# Patient Record
Sex: MALE
[De-identification: names, ages, dates, MRNs, and addresses within clinical notes are randomized per-mention and may not be internally consistent; named-entity substitution may affect disease eponyms.]

---

## 2017-03-09 ENCOUNTER — HOSPITAL (OUTPATIENT)
Dept: OTHER | Age: 52
End: 2017-03-09
Attending: INTERNAL MEDICINE

## 2017-03-09 LAB
A/G RATIO_: 1.2
ABS LYMPH: 2.2 K/CUMM (ref 1–3.5)
ABS MONO: 0.9 K/CUMM (ref 0.1–0.8)
ABS NEUTRO: 3.6 K/CUMM (ref 2–8)
ALBUMIN: 4.1 G/DL (ref 3.5–5)
ALK PHOS: 66 UNIT/L (ref 50–124)
ALT/GPT: 50 UNIT/L (ref 0–55)
ANION GAP SERPL CALC-SCNC: 13 MEQ/L (ref 10–20)
AST/GOT: 24 UNIT/L (ref 5–34)
BASOPHIL: 1 % (ref 0–1)
BILI TOTAL: 0.5 MG/DL (ref 0.2–1)
BUN SERPL-MCNC: 20 MG/DL (ref 6–20)
CALCIUM: 9.7 MG/DL (ref 8.4–10.2)
CHLORIDE: 109 MEQ/L (ref 97–107)
CREATININE: 0.81 MG/DL (ref 0.6–1.3)
DIFF_TYPE?: NORMAL
EOSINOPHIL: 3 % (ref 0–6)
GLOBULIN_: 3.3 G/DL (ref 2–4.1)
GLUCOSE LVL: 104 MG/DL (ref 70–99)
HCT VFR BLD CALC: 44 % (ref 36–51)
HEMOLYSIS 2+: NEGATIVE
HEMOLYSIS 2+: NEGATIVE
HEMOLYSIS 4+: NEGATIVE
HGB BLD-MCNC: 15.8 G/DL (ref 12–17)
ICTERIC 4+: NEGATIVE
IMMATURE GRAN: 0.7 % (ref 0–0.3)
INSTR WBC: 7 K/CUMM (ref 4–11)
LIPEMIC 3+: NEGATIVE
LYMPHOCYTE: 32 %
MAGNESIUM LEVEL: 2.1 MG/DL (ref 1.6–2.6)
MCH RBC QN AUTO: 32 PG (ref 25–35)
MCHC RBC AUTO-ENTMCNC: 36 G/DL (ref 32–37)
MCV RBC AUTO: 88 FL (ref 78–97)
MONOCYTE: 13 %
NEUTROPHIL: 51 %
NRBC BLD MANUAL-RTO: 0 % (ref 0–0.2)
PLATELET: 224 K/CUMM (ref 150–450)
POTASSIUM: 3.9 MEQ/L (ref 3.5–5.1)
RBC # BLD: 5.01 M/CUMM (ref 4.2–6)
RDW: 11.7 % (ref 11.5–14.5)
SODIUM: 141 MEQ/L (ref 136–145)
TCO2: 23 MEQ/L (ref 19–29)
TOTAL PROTEIN: 7.4 G/DL (ref 6.4–8.3)
TROPONIN I: <0.01 NG/ML
WBC # BLD: 7 K/CUMM (ref 4–11)

## 2017-03-10 LAB
ABS LYMPH: 2.5 K/CUMM (ref 1–3.5)
ABS MONO: 0.8 K/CUMM (ref 0.1–0.8)
ABS NEUTRO: 2.9 K/CUMM (ref 2–8)
ANION GAP SERPL CALC-SCNC: 16 MEQ/L (ref 10–20)
BASOPHIL: 0 % (ref 0–1)
BUN SERPL-MCNC: 18 MG/DL (ref 6–20)
CALCIUM: 8.8 MG/DL (ref 8.4–10.2)
CHLORIDE: 108 MEQ/L (ref 97–107)
CHOLESTEROL: 148 MG/DL
CREATININE: 0.77 MG/DL (ref 0.6–1.3)
DEVICE SN: NORMAL
DIFF_TYPE?: NORMAL
EOSINOPHIL: 4 % (ref 0–6)
GLUCOSE LVL: 112 MG/DL (ref 70–99)
HCT VFR BLD CALC: 43 % (ref 36–51)
HDLC SERPL-MCNC: 26 MG/DL (ref 40–60)
HEMOLYSIS 2+: NEGATIVE
HEMOLYSIS 4+: NEGATIVE
HGB BLD-MCNC: 15.2 G/DL (ref 12–17)
ICTERIC 4+: NEGATIVE
IMMATURE GRAN: 0.8 % (ref 0–0.3)
INSTR WBC: 6.5 K/CUMM (ref 4–11)
LDL DIRECT: 69 MG/DL (ref 0–100)
LDLC SERPL CALC-MCNC: ABNORMAL MG/DL
LYMPHOCYTE: 38 %
MCH RBC QN AUTO: 32 PG (ref 25–35)
MCHC RBC AUTO-ENTMCNC: 36 G/DL (ref 32–37)
MCV RBC AUTO: 89 FL (ref 78–97)
MONOCYTE: 12 %
NEUTROPHIL: 45 %
NRBC BLD MANUAL-RTO: 0 % (ref 0–0.2)
PLATELET: 205 K/CUMM (ref 150–450)
POC_GLU: 96 MG/DL (ref 70–99)
POTASSIUM: 4 MEQ/L (ref 3.5–5.1)
RBC # BLD: 4.82 M/CUMM (ref 4.2–6)
RDW: 11.6 % (ref 11.5–14.5)
SODIUM: 142 MEQ/L (ref 136–145)
TCO2: 22 MEQ/L (ref 19–29)
TECH_ID: NORMAL
TRIGL SERPL-MCNC: 403 MG/DL
TROPONIN I: 0.01 NG/ML
WBC # BLD: 6.5 K/CUMM (ref 4–11)

## 2017-05-31 ENCOUNTER — HOSPITAL (OUTPATIENT)
Dept: OTHER | Age: 52
End: 2017-05-31
Attending: EMERGENCY MEDICINE

## 2017-05-31 LAB
A/G RATIO_: 1.2
ABS LYMPH MAN: 2.6 K/CUMM (ref 1–3.5)
ABS MONO MAN: 0.8 K/CUMM (ref 0.1–0.8)
ABS NEUT MAN: 5.6 K/CUMM (ref 1.8–7.8)
ALBUMIN: 4 G/DL (ref 3.5–5)
ALK PHOS: 62 UNIT/L (ref 50–124)
ALT/GPT: 60 UNIT/L (ref 0–55)
ANION GAP SERPL CALC-SCNC: 16 MEQ/L (ref 10–20)
AST/GOT: 31 UNIT/L (ref 5–34)
BAND MAN: 1 % (ref 2–8)
BASOPHIL MAN: 0 % (ref 0–1)
BILI TOTAL: 0.5 MG/DL (ref 0.2–1)
BUN SERPL-MCNC: 13 MG/DL (ref 6–20)
CALCIUM: 9.7 MG/DL (ref 8.4–10.2)
CHLORIDE: 107 MEQ/L (ref 97–107)
CREATININE: 1.11 MG/DL (ref 0.6–1.3)
DIFF_TYPE?: ABNORMAL
EOS MAN: 4 % (ref 0–4)
GLOBULIN_: 3.4 G/DL (ref 2–4.1)
GLUCOSE LVL: 85 MG/DL (ref 70–99)
HCT VFR BLD CALC: 44 % (ref 36–51)
HEMOLYSIS 2+: NEGATIVE
HEMOLYSIS 4+: NEGATIVE
HGB BLD-MCNC: 15.6 G/DL (ref 12–17)
ICTERIC 4+: NEGATIVE
ICTERIC 4+: NEGATIVE
INSTR WBC: 9.4 K/CUMM (ref 4–11)
LIPASE LEVEL: 27 UNIT/L (ref 8–78)
LIPEMIC 3+: ABNORMAL
LYMPH MAN: 14 %
MCH RBC QN AUTO: 32 PG (ref 25–35)
MCHC RBC AUTO-ENTMCNC: 35 G/DL (ref 32–37)
MCV RBC AUTO: 90 FL (ref 78–97)
MONOCYTE MAN: 8 %
NRBC BLD MANUAL-RTO: 0 % (ref 0–0.2)
PLATELET: 224 K/CUMM (ref 150–450)
PLT ESTIMATE: ADEQUATE
POTASSIUM: 4 MEQ/L (ref 3.5–5.1)
RBC # BLD: 4.93 M/CUMM (ref 4.2–6)
RBC MORPH: NORMAL
RDW: 11.5 % (ref 11.5–14.5)
REACT LYMPH MAN: 14 %
SEGS MAN: 59 %
SODIUM: 143 MEQ/L (ref 136–145)
TCO2: 24 MEQ/L (ref 19–29)
TOTAL LYMPHS MANUAL: 28 %
TOTAL PROTEIN: 7.4 G/DL (ref 6.4–8.3)
TROPONIN I: <0.01 NG/ML
WBC # BLD: 9.4 K/CUMM (ref 4–11)

## 2017-08-03 ENCOUNTER — HOSPITAL (OUTPATIENT)
Dept: OTHER | Age: 52
End: 2017-08-03
Attending: INTERNAL MEDICINE

## 2017-08-03 LAB
A/G RATIO_: 1.1
ABS LYMPH: 1.3 K/CUMM (ref 1–3.5)
ABS MONO: 1.1 K/CUMM (ref 0.1–0.8)
ABS NEUTRO: 7 K/CUMM (ref 2–8)
ALBUMIN: 3.7 G/DL (ref 3.5–5)
ALK PHOS: 59 UNIT/L (ref 50–124)
ALT/GPT: 30 UNIT/L (ref 0–55)
ANION GAP SERPL CALC-SCNC: 14 MEQ/L (ref 10–20)
APTT PPP: 32 SECONDS (ref 22–30)
AST/GOT: 19 UNIT/L (ref 5–34)
BASOPHIL: 0 % (ref 0–1)
BILI TOTAL: 0.6 MG/DL (ref 0.2–1)
BNP: 20 PG/ML (ref 1–100)
BUN SERPL-MCNC: 11 MG/DL (ref 6–20)
CALCIUM: 9 MG/DL (ref 8.4–10.2)
CHLORIDE: 105 MEQ/L (ref 97–107)
CREATININE: 0.92 MG/DL (ref 0.6–1.3)
DIFF_TYPE?: NORMAL
EOSINOPHIL: 2 % (ref 0–6)
GLOBULIN_: 3.4 G/DL (ref 2–4.1)
GLUCOSE LVL: 106 MG/DL (ref 70–99)
HCT VFR BLD CALC: 42 % (ref 36–51)
HEMOLYSIS 2+: NEGATIVE
HEMOLYSIS 4+: NEGATIVE
HEMOLYSIS 4+: NEGATIVE
HGB BLD-MCNC: 14.8 G/DL (ref 12–17)
ICTERIC 4+: NEGATIVE
IMMATURE GRAN: 0.5 % (ref 0–0.3)
INR: 1.6 (ref 0.9–1.1)
INSTR WBC: 9.6 K/CUMM (ref 4–11)
LIPEMIC 3+: NEGATIVE
LITHIUM LEVEL: 0.7 MEQ/L (ref 0.5–1.5)
LYMPHOCYTE: 13 %
MCH RBC QN AUTO: 32 PG (ref 25–35)
MCHC RBC AUTO-ENTMCNC: 35 G/DL (ref 32–37)
MCV RBC AUTO: 92 FL (ref 78–97)
MONOCYTE: 12 %
NEUTROPHIL: 72 %
NRBC BLD MANUAL-RTO: 0 % (ref 0–0.2)
PLATELET: 176 K/CUMM (ref 150–450)
POTASSIUM: 3.9 MEQ/L (ref 3.5–5.1)
PROTHROMBIN TIME: 16.6 SECONDS (ref 9.7–11.6)
RBC # BLD: 4.57 M/CUMM (ref 4.2–6)
RDW: 11.5 % (ref 11.5–14.5)
SODIUM: 138 MEQ/L (ref 136–145)
TCO2: 23 MEQ/L (ref 19–29)
TOTAL PROTEIN: 7.1 G/DL (ref 6.4–8.3)
TROPONIN I: <0.01 NG/ML
WBC # BLD: 9.6 K/CUMM (ref 4–11)

## 2017-08-04 LAB
APTT PPP: 27 SECONDS (ref 22–30)
INR: 1.1 (ref 0.9–1.1)
PROTHROMBIN TIME: 11.4 SECONDS (ref 9.7–11.6)

## 2017-10-25 ENCOUNTER — HOSPITAL (OUTPATIENT)
Dept: OTHER | Age: 52
End: 2017-10-25
Attending: NURSE PRACTITIONER

## 2017-10-25 LAB
A/G RATIO_: 1
ABS LYMPH: 1 K/CUMM (ref 1–3.5)
ABS MONO: 0.2 K/CUMM (ref 0.1–0.8)
ABS NEUTRO: 7.1 K/CUMM (ref 2–8)
ALBUMIN: 4 G/DL (ref 3.5–5)
ALK PHOS: 70 UNIT/L (ref 50–124)
ALT/GPT: 26 UNIT/L (ref 0–55)
ANION GAP SERPL CALC-SCNC: 15 MEQ/L (ref 10–20)
AST/GOT: 16 UNIT/L (ref 5–34)
BASOPHIL: 0 % (ref 0–1)
BILI TOTAL: 0.5 MG/DL (ref 0.2–1)
BUN SERPL-MCNC: 17 MG/DL (ref 6–20)
CALCIUM: 9.9 MG/DL (ref 8.4–10.2)
CHLORIDE: 108 MEQ/L (ref 97–107)
CREATININE: 0.91 MG/DL (ref 0.6–1.3)
CRP INFLAMMATION: 2 MG/L (ref 0.1–8.2)
DIFF_TYPE?: NORMAL
EOSINOPHIL: 0 % (ref 0–6)
GLOBULIN_: 3.9 G/DL (ref 2–4.1)
GLUCOSE LVL: 176 MG/DL (ref 70–99)
HCT VFR BLD CALC: 46 % (ref 36–51)
HEMOLYSIS 2+: NEGATIVE
HGB BLD-MCNC: 16.2 G/DL (ref 12–17)
IMMATURE GRAN: 0.6 % (ref 0–0.3)
INSTR WBC: 8.3 K/CUMM (ref 4–11)
LIPEMIC 3+: NEGATIVE
LITHIUM LEVEL: 0.2 MEQ/L (ref 0.5–1.5)
LYMPHOCYTE: 12 %
MCH RBC QN AUTO: 32 PG (ref 25–35)
MCHC RBC AUTO-ENTMCNC: 35 G/DL (ref 32–37)
MCV RBC AUTO: 91 FL (ref 78–97)
MONOCYTE: 2 %
NEUTROPHIL: 85 %
NRBC BLD MANUAL-RTO: 0 % (ref 0–0.2)
PLATELET: 237 K/CUMM (ref 150–450)
POTASSIUM: 4 MEQ/L (ref 3.5–5.1)
RBC # BLD: 5.07 M/CUMM (ref 4.2–6)
RDW: 11.5 % (ref 11.5–14.5)
SODIUM: 142 MEQ/L (ref 136–145)
TCO2: 23 MEQ/L (ref 19–29)
TOTAL CK: 59 U/L (ref 30–200)
TOTAL PROTEIN: 7.9 G/DL (ref 6.4–8.3)
WBC # BLD: 8.3 K/CUMM (ref 4–11)

## 2017-12-20 ENCOUNTER — HOSPITAL (OUTPATIENT)
Dept: OTHER | Age: 52
End: 2017-12-20
Attending: FAMILY MEDICINE

## 2018-01-08 ENCOUNTER — HOSPITAL (OUTPATIENT)
Dept: OTHER | Age: 53
End: 2018-01-08
Attending: FAMILY MEDICINE

## 2018-03-13 ENCOUNTER — HOSPITAL (OUTPATIENT)
Dept: OTHER | Age: 53
End: 2018-03-13
Attending: NURSE PRACTITIONER

## 2018-03-13 LAB
A/G RATIO_: 1.2
ALBUMIN: 4.1 G/DL (ref 3.5–5)
ALK PHOS: 67 UNIT/L (ref 50–124)
ALT/GPT: 26 UNIT/L (ref 0–55)
ANION GAP SERPL CALC-SCNC: 10 MEQ/L (ref 10–20)
AST/GOT: 14 UNIT/L (ref 5–34)
BILI TOTAL: 0.8 MG/DL (ref 0.2–1)
BUN SERPL-MCNC: 19 MG/DL (ref 6–20)
CALCIUM: 9.5 MG/DL (ref 8.4–10.2)
CHLORIDE: 108 MEQ/L (ref 97–107)
CHOLESTEROL: 145 MG/DL
CREATININE: 0.86 MG/DL (ref 0.6–1.3)
GLOBULIN_: 3.5 G/DL (ref 2–4.1)
GLUCOSE LVL: 103 MG/DL (ref 70–99)
HDLC SERPL-MCNC: 36 MG/DL (ref 40–60)
HEMOLYSIS 2+: NEGATIVE
HEMOLYSIS 4+: NEGATIVE
LDLC SERPL CALC-MCNC: 68 MG/DL
LIPEMIC 3+: NEGATIVE
POTASSIUM: 4.1 MEQ/L (ref 3.5–5.1)
PSA LEVEL: 0.29 NG/ML (ref 0–4)
SODIUM: 139 MEQ/L (ref 136–145)
TCO2: 25 MEQ/L (ref 19–29)
TOTAL PROTEIN: 7.6 G/DL (ref 6.4–8.3)
TRIGL SERPL-MCNC: 203 MG/DL
TSH SERPL-ACNC: 2.4 MIU/ML (ref 0.4–5)

## 2018-05-08 ENCOUNTER — HOSPITAL (OUTPATIENT)
Dept: OTHER | Age: 53
End: 2018-05-08
Attending: INTERNAL MEDICINE

## 2018-05-08 LAB
A/G RATIO_: 1.1
ABS LYMPH: 2.2 K/CUMM (ref 1–3.5)
ABS MONO: 1.3 K/CUMM (ref 0.1–0.8)
ABS NEUTRO: 4 K/CUMM (ref 2–8)
ALBUMIN: 4 G/DL (ref 3.5–5)
ALK PHOS: 58 UNIT/L (ref 50–124)
ALT/GPT: 35 UNIT/L (ref 0–55)
ANION GAP SERPL CALC-SCNC: 13 MEQ/L (ref 10–20)
APTT PPP: 27 SECONDS (ref 22–30)
AST/GOT: 20 UNIT/L (ref 5–34)
BASOPHIL: 0 % (ref 0–1)
BILI TOTAL: 0.6 MG/DL (ref 0.2–1)
BNP: <10 PG/ML (ref 1–100)
BUN SERPL-MCNC: 13 MG/DL (ref 6–20)
CALCIUM: 9.8 MG/DL (ref 8.4–10.2)
CHLORIDE: 107 MEQ/L (ref 97–107)
CREATININE: 0.97 MG/DL (ref 0.6–1.3)
DIFF_TYPE?: NORMAL
EOSINOPHIL: 4 % (ref 0–6)
GLOBULIN_: 3.8 G/DL (ref 2–4.1)
GLUCOSE LVL: 99 MG/DL (ref 70–99)
HCT VFR BLD CALC: 45 % (ref 36–51)
HEMOLYSIS 2+: NEGATIVE
HEMOLYSIS 4+: NEGATIVE
HEMOLYSIS 4+: NEGATIVE
HGB BLD-MCNC: 15.6 G/DL (ref 12–17)
ICTERIC 4+: NEGATIVE
IMMATURE GRAN: 0.4 % (ref 0–0.3)
INR: 1.1 (ref 0.9–1.1)
INSTR WBC: 7.9 K/CUMM (ref 4–11)
LIPEMIC 3+: NEGATIVE
LYMPHOCYTE: 29 %
MCH RBC QN AUTO: 32 PG (ref 25–35)
MCHC RBC AUTO-ENTMCNC: 34 G/DL (ref 32–37)
MCV RBC AUTO: 92 FL (ref 78–97)
MONOCYTE: 16 %
NEUTROPHIL: 51 %
NRBC BLD MANUAL-RTO: 0 % (ref 0–0.2)
PLATELET: 179 K/CUMM (ref 150–450)
POTASSIUM: 3.8 MEQ/L (ref 3.5–5.1)
PROTHROMBIN TIME: 11 SECONDS (ref 9.7–11.6)
RBC # BLD: 4.93 M/CUMM (ref 4.2–6)
RDW: 11.5 % (ref 11.5–14.5)
SODIUM: 140 MEQ/L (ref 136–145)
TCO2: 24 MEQ/L (ref 19–29)
TOTAL PROTEIN: 7.8 G/DL (ref 6.4–8.3)
TROPONIN I: <0.01 NG/ML
WBC # BLD: 7.9 K/CUMM (ref 4–11)

## 2018-05-09 LAB
ABS LYMPH: 0.6 K/CUMM (ref 1–3.5)
ABS MONO: 0.3 K/CUMM (ref 0.1–0.8)
ABS NEUTRO: 7.7 K/CUMM (ref 2–8)
ANION GAP SERPL CALC-SCNC: 14 MEQ/L (ref 10–20)
BASOPHIL: 0 % (ref 0–1)
BUN SERPL-MCNC: 14 MG/DL (ref 6–20)
CALCIUM: 9.3 MG/DL (ref 8.4–10.2)
CHLORIDE: 108 MEQ/L (ref 97–107)
CREATININE: 0.81 MG/DL (ref 0.6–1.3)
DIFF_TYPE?: ABNORMAL
EOSINOPHIL: 0 % (ref 0–6)
GLUCOSE LVL: 213 MG/DL (ref 70–99)
HCT VFR BLD CALC: 41 % (ref 36–51)
HEMOLYSIS 2+: NEGATIVE
HGB BLD-MCNC: 14.1 G/DL (ref 12–17)
IMMATURE GRAN: 0.6 % (ref 0–0.3)
INSTR WBC: 8.7 K/CUMM (ref 4–11)
LYMPHOCYTE: 7 %
MCH RBC QN AUTO: 32 PG (ref 25–35)
MCHC RBC AUTO-ENTMCNC: 34 G/DL (ref 32–37)
MCV RBC AUTO: 93 FL (ref 78–97)
MONOCYTE: 4 %
NEUTROPHIL: 88 %
NRBC BLD MANUAL-RTO: 0 % (ref 0–0.2)
PLATELET: 182 K/CUMM (ref 150–450)
POTASSIUM: 4 MEQ/L (ref 3.5–5.1)
RBC # BLD: 4.4 M/CUMM (ref 4.2–6)
RDW: 11.1 % (ref 11.5–14.5)
SODIUM: 137 MEQ/L (ref 136–145)
TCO2: 19 MEQ/L (ref 19–29)
WBC # BLD: 8.7 K/CUMM (ref 4–11)

## 2018-05-10 LAB
ABS LYMPH: 1 K/CUMM (ref 1–3.5)
ABS MONO: 0.6 K/CUMM (ref 0.1–0.8)
ABS NEUTRO: 14.9 K/CUMM (ref 2–8)
ANION GAP SERPL CALC-SCNC: 14 MEQ/L (ref 10–20)
BASOPHIL: 0 % (ref 0–1)
BUN SERPL-MCNC: 16 MG/DL (ref 6–20)
CALCIUM: 9.3 MG/DL (ref 8.4–10.2)
CHLORIDE: 108 MEQ/L (ref 97–107)
CREATININE: 0.81 MG/DL (ref 0.6–1.3)
DIFF_TYPE?: ABNORMAL
EOSINOPHIL: 0 % (ref 0–6)
GLUCOSE LVL: 161 MG/DL (ref 70–99)
HCT VFR BLD CALC: 44 % (ref 36–51)
HEMOLYSIS 2+: NEGATIVE
HGB BLD-MCNC: 14.9 G/DL (ref 12–17)
IMMATURE GRAN: 0.8 % (ref 0–0.3)
INSTR WBC: 16.7 K/CUMM (ref 4–11)
LYMPHOCYTE: 6 %
MCH RBC QN AUTO: 32 PG (ref 25–35)
MCHC RBC AUTO-ENTMCNC: 34 G/DL (ref 32–37)
MCV RBC AUTO: 93 FL (ref 78–97)
MONOCYTE: 4 %
NEUTROPHIL: 89 %
NRBC BLD MANUAL-RTO: 0 % (ref 0–0.2)
PLATELET: 198 K/CUMM (ref 150–450)
PLT ESTIMATE: ADEQUATE
POTASSIUM: 4.2 MEQ/L (ref 3.5–5.1)
RBC # BLD: 4.7 M/CUMM (ref 4.2–6)
RBC MORPH: NORMAL
RDW: 11.4 % (ref 11.5–14.5)
SODIUM: 141 MEQ/L (ref 136–145)
TCO2: 23 MEQ/L (ref 19–29)
VANCO TR: 6.7 UG/ML (ref 5–15)
WBC # BLD: 16.7 K/CUMM (ref 4–11)

## 2018-05-12 LAB — VANCO TR: 8.4 UG/ML (ref 5–15)

## 2018-05-13 LAB — VANCO TR: 12.7 UG/ML (ref 5–15)

## 2018-05-31 ENCOUNTER — HOSPITAL (OUTPATIENT)
Dept: OTHER | Age: 53
End: 2018-05-31
Attending: INTERNAL MEDICINE

## 2018-05-31 LAB
ABS LYMPH: 1.9 K/CUMM (ref 1–3.5)
ABS MONO: 0.8 K/CUMM (ref 0.1–0.8)
ABS NEUTRO: 4.4 K/CUMM (ref 2–8)
BASOPHIL: 1 % (ref 0–1)
BUN POC: 13 MG/DL (ref 7–22)
CREATININE POC: 0.8 MG/DL (ref 0.6–1.2)
DIFF_TYPE?: ABNORMAL
EGFR POC: 60 ML/MIN
EOSINOPHIL: 2 % (ref 0–6)
HCT VFR BLD CALC: 42 % (ref 36–51)
HGB BLD-MCNC: 14.4 G/DL (ref 12–17)
IGE TOTAL: 85.5 IUNITS/ML
IMMATURE GRAN: 1.2 % (ref 0–0.3)
INSTR WBC: 7.4 K/CUMM (ref 4–11)
LYMPHOCYTE: 26 %
MCH RBC QN AUTO: 31 PG (ref 25–35)
MCHC RBC AUTO-ENTMCNC: 34 G/DL (ref 32–37)
MCV RBC AUTO: 92 FL (ref 78–97)
MONOCYTE: 10 %
NEUTROPHIL: 60 %
NRBC BLD MANUAL-RTO: 0 % (ref 0–0.2)
PLATELET: 146 K/CUMM (ref 150–450)
RBC # BLD: 4.59 M/CUMM (ref 4.2–6)
RDW: 11.9 % (ref 11.5–14.5)
WBC # BLD: 7.4 K/CUMM (ref 4–11)

## 2018-06-01 ENCOUNTER — HOSPITAL (OUTPATIENT)
Dept: OTHER | Age: 53
End: 2018-06-01
Attending: INTERNAL MEDICINE

## 2018-06-02 ENCOUNTER — HOSPITAL (OUTPATIENT)
Dept: OTHER | Age: 53
End: 2018-06-02
Attending: INTERNAL MEDICINE

## 2020-01-15 ENCOUNTER — TRANSCRIBE ORDERS (OUTPATIENT)
Dept: OCCUPATIONAL THERAPY | Facility: CLINIC | Age: 55
End: 2020-01-15

## 2020-01-15 ENCOUNTER — TREATMENT (OUTPATIENT)
Dept: PHYSICAL THERAPY | Facility: CLINIC | Age: 55
End: 2020-01-15

## 2020-01-15 DIAGNOSIS — S39.012S STRAIN OF LUMBAR REGION, SEQUELA: ICD-10-CM

## 2020-01-15 DIAGNOSIS — S29.019S THORACIC MYOFASCIAL STRAIN, SEQUELA: Primary | ICD-10-CM

## 2020-01-15 DIAGNOSIS — S29.019D THORACIC MYOFASCIAL STRAIN, SUBSEQUENT ENCOUNTER: Primary | ICD-10-CM

## 2020-01-15 DIAGNOSIS — S39.012D STRAIN OF LUMBAR REGION, SUBSEQUENT ENCOUNTER: ICD-10-CM

## 2020-01-15 PROCEDURE — 97161 PT EVAL LOW COMPLEX 20 MIN: CPT | Performed by: PHYSICAL THERAPIST

## 2020-01-15 PROCEDURE — 97014 ELECTRIC STIMULATION THERAPY: CPT | Performed by: PHYSICAL THERAPIST

## 2020-01-15 PROCEDURE — 97035 APP MDLTY 1+ULTRASOUND EA 15: CPT | Performed by: PHYSICAL THERAPIST

## 2020-01-15 NOTE — PROGRESS NOTES
Physical Therapy Initial Evaluation and Plan of Care    Patient: Reddy Stewart   : 1965  Diagnosis/ICD-10 Code:  Thoracic myofascial strain, subsequent encounter [S29.019D]  Referring practitioner: Benjamin Crum MD    Subjective Evaluation    History of Present Illness  Date of onset: 1/3/2020  Mechanism of injury: Lifting a dresser at [employer]ok - some strain, it then shifted and he caught it causing increased pain   Same day BW - toradol, aleve and flexeril  BW today - pred pack, flexeril, ibu  Works on cars, hunts, fishes  Had been doing better until he worked yesterday - had to work outside of [employer] causing increased pain        Patient Occupation: [employer] 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[employer], Receiving,m shipping of donated goods Pain  Current pain ratin  At best pain ratin  At worst pain ratin  Location: Left mid to lower thoracic musculature, into left buttocks, occasional tingling into the lateral calf to 3-5th toes  Quality: sharp, knife-like, radiating and cramping  Relieving factors: change in position, relaxation, heat and medications (stretching)  Aggravating factors: repetitive movement, outstretched reach, overhead activity, lifting and squatting (sitting, deep breath, transition sit to stand)  Progression: worsening    Social Support  Lives in: multiple-level home    Hand dominance: left    Diagnostic Tests  No diagnostic tests performed    Treatments  Previous treatment: medication  Current treatment: medication and physical therapy  Patient Goals  Patient goal: GEt better and RTW     Objective     Special Questions  Patient is experiencing disturbed sleep.     Additional Special Questions  Positive Valsalva      Postural Observations  Seated posture: poor  Standing posture: fair    Additional Postural Observation Details  Very guarded movement patterns - guarded gait as well after transitioning from sitting but was noted earlier walking with more fluid gait pattern  Poor seated  posture with protracted shoulders    Neurological Testing     Sensation     Lumbar   Left   Intact: light touch    Right   Intact: light touch    Active Range of Motion     Lumbar   Flexion: 50 (pain with return to neutral) degrees with pain  Extension: 50 degrees   Left lateral flexion: 75 degrees   Right lateral flexion: 75 degrees   Left rotation: 75 degrees   Right rotation: 75 degrees     Additional Active Range of Motion Details  Measurements listed as taken in percentages      Strength/Myotome Testing     Left Hip   Planes of Motion   Flexion: 4-    Right Hip   Planes of Motion   Flexion: 4-    Left Knee   Flexion: 4-  Extension: 4    Right Knee   Flexion: 4-  Extension: 4    Left Ankle/Foot   Dorsiflexion: 5  Great toe extension: 5    Right Ankle/Foot   Dorsiflexion: 5  Great toe extension: 5    Additional Strength Details  Pain inhibition with testing of hip and knee mm    Tests       Thoracic   Negative slump.     Lumbar     Left   Positive valsalva.   Negative crossed SLR and passive SLR.     Right   Negative passive SLR.     Left Pelvic Girdle/Sacrum   Negative: sacrum compression and gapping.          Assessment & Plan     Assessment  Impairments: abnormal gait, abnormal muscle firing, abnormal muscle tone, abnormal or restricted ROM, activity intolerance, impaired physical strength, lacks appropriate home exercise program and pain with function  Assessment details: 54 y.o. Male with thoracolumbar strain with exacerbation yesterday presents with: 1. Constant pain, 2. Decreased spinal AROM, 3. Increased muscle tone in Left>right thoracolumbar PVM, 4. Guarded transitional movements, 5. 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  Patient will have functional pain free spinal AROM  Patient will be able to lift 50# from floor to waist without increased symptoms  Patient will return to work with min/no restrictions      Plan  Therapy options: will be seen for skilled physical therapy services  Planned modality interventions: TENS, thermotherapy (hydrocollator packs) and ultrasound  Planned therapy interventions: manual therapy, strengthening, stretching, postural training and home exercise program  Frequency: 3x week  Duration in visits: 12  Duration in weeks: 4  Treatment plan discussed with: patient  Plan details: Patient issued written HEP of exercises performed in clinic today      Manual Therapy:    0     mins  00145;  Therapeutic Exercise:    5     mins  37440;     Neuromuscular Roxanna:    0    mins  55361;    Therapeutic Activity:     5     mins  85741;   Rehab process    Evaluation Time:     25  mins  Timed Treatment:   18   mins   Total Treatment:     75   mins    PT SIGNATURE: Lizet Foster, PT   DATE TREATMENT INITIATED: 1/15/2020    Initial Certification  Certification Period: 4/14/2020  I certify that the therapy services are furnished while this patient is under my care.  The services outlined above are required by this patient, and will be reviewed every 90 days.     PHYSICIAN: Benjamin Crum MD ___________________________      DATE: _______________________    Please sign and return via fax to [phone].. Thank you, [facility].

## 2020-01-20 ENCOUNTER — TREATMENT (OUTPATIENT)
Dept: PHYSICAL THERAPY | Facility: CLINIC | Age: 55
End: 2020-01-20

## 2020-01-20 DIAGNOSIS — S39.012D STRAIN OF LUMBAR REGION, SUBSEQUENT ENCOUNTER: ICD-10-CM

## 2020-01-20 DIAGNOSIS — S29.019D THORACIC MYOFASCIAL STRAIN, SUBSEQUENT ENCOUNTER: Primary | ICD-10-CM

## 2020-01-20 PROCEDURE — 97014 ELECTRIC STIMULATION THERAPY: CPT | Performed by: PHYSICAL THERAPIST

## 2020-01-20 PROCEDURE — 97110 THERAPEUTIC EXERCISES: CPT | Performed by: PHYSICAL THERAPIST

## 2020-01-20 PROCEDURE — 97035 APP MDLTY 1+ULTRASOUND EA 15: CPT | Performed by: PHYSICAL THERAPIST

## 2020-01-20 NOTE — PROGRESS NOTES
Physical Therapy Daily Progress Note    VISIT#: 2    Subjective   Reddy Stewart reports: that he has some increased soreness after normal ADL's yesterday.  Pain is mostly on the left side with minimal sorness in the right.  He denies any LE pain.  Notes mostly tightness with some slight pain.  States that he is taking his IBU tiw and flexeril biw.      Objective   Presents moving in fluid movement patterns    Spinal flexion 75% with complaints of pain greater with return to neutral than initial flexion    See Exercise, Manual, and Modality Logs for complete treatment.     Patient Education:    Assessment/Plan  Increased exercise tolerance with more fluid movements.  Less noted abnormal muscle tone in thoracic PVM.    Progress strengthening /stabilization /functional activity           Manual Therapy:    0     mins  96301;  Therapeutic Exercise:    30     mins  28234;     Neuromuscular Roxanna:    0    mins  25068;    Therapeutic Activity:     0     mins  84933;       Timed Treatment:   38   mins   Total Treatment:     70   mins    Lizet Foster, PT  [place] License # [id number]  Physical Therapist

## 2020-01-22 ENCOUNTER — TREATMENT (OUTPATIENT)
Dept: PHYSICAL THERAPY | Facility: CLINIC | Age: 55
End: 2020-01-22

## 2020-01-22 DIAGNOSIS — S29.019D THORACIC MYOFASCIAL STRAIN, SUBSEQUENT ENCOUNTER: Primary | ICD-10-CM

## 2020-01-22 DIAGNOSIS — S39.012D STRAIN OF LUMBAR REGION, SUBSEQUENT ENCOUNTER: ICD-10-CM

## 2020-01-22 PROCEDURE — 97110 THERAPEUTIC EXERCISES: CPT | Performed by: PHYSICAL THERAPIST

## 2020-01-22 PROCEDURE — 97014 ELECTRIC STIMULATION THERAPY: CPT | Performed by: PHYSICAL THERAPIST

## 2020-01-22 PROCEDURE — 97530 THERAPEUTIC ACTIVITIES: CPT | Performed by: PHYSICAL THERAPIST

## 2020-01-22 NOTE — PROGRESS NOTES
MD Letter - Reassessment    Date of Initial Visit: Type: THERAPY  Noted: 1/15/2020  Today's Date: 1/22/2020  Patient seen for 3 sessions    Treatment has included: therapeutic exercise, electrical stimulation, ultrasound and moist heat He has had to cancel 1 appt due to illness.     Subjective   Reports that his back is feeling better than last week but still feels quite stiff.  He coniniues to report that left thoracolumbar tightness but the pain has decreased.  He denies any radiation of symptoms.  He states that his pain now varies from 0-5/10 compared to 4-7/10 upon his initial evaluation.      Objective - he presents moving in fluid movement patterns  Spinal AROM - flexion 75%, extension 75%, Lateral flexion and rotation 75% bilaterally.  He reports pain with return to neutral from all excursions of movement  Strength - no focal weakness noted in LE's  Sensation - intact  Palpation - increased muscle tone in the left>right thoracolumbar PVM, reported increased pain with PA glides of T9-L3 spinous processes  Special tests - negative for any abnormal neural involvement   Activity tolerances - he is able to walk for moderately prolonged periods of time without pain.  He has soreness with prologned sitting.  He is able to slift 20# to waist level without pain but had pain with attempts of lifting to shoulder level.    Assessment/Plan  Patient has demonstrated moderate improvement since the initiation of therapy.  The pain has  Decreased in both frequency and intensity.  The motion has increased.  The activity tolerances have increased but not to work demand level.  I feel that the patient would benefit from continued therapy.  If you have any questions concerning the care, please do not hesitate to contact me.          PT Signature: Lizet Foster, PT        Manual Therapy:    0     mins  10779;  Therapeutic Exercise:    25/30     mins  71014;     Neuromuscular Roxanna:    0    mins  01927;    Therapeutic Activity:      10     mins  61390; Assessed for MD and work status      Timed Treatment:   35   mins   Total Treatment:     60   mins

## 2020-01-27 NOTE — PROGRESS NOTES
MD Note    Patient: Reddy Stewart   Diagnosis/ICD-10 Code:  Thoracic myofascial strain, subsequent encounter [S29.019D]  Referring practitioner: Benjamin Crum MD  Date of Initial Visit: Type: THERAPY  Noted: 1/15/2020  Today's Date: 1/29/2020  Patient seen for 4/5 sessions    Treatment has included: therapeutic exercise, electrical stimulation, ultrasound and moist heat  Subjective   My back is feeling a little  better with pain rated 6/10 midline L-S - L>R.  I've done all my exercises at home.  I do get some soreness after. I have difficulty putting on my socks in the morning.       Objective - he presents moving in fluid movement patterns  Spinal AROM - flexion WFL with LLBP, extension 75% with LLBP, Lateral flexion and rotation 75% bilaterally.  He reports pain with return to neutral from all excursions of movement  Strength - B LE myotomes 5/5  Sensation - intact  Palpation - increased muscle tone in the left>right thoracolumbar PVM, reported increased pain with PA glides of T8-L1 spinous processes  Special tests - LBP only with slump testing and ASLR at 45 degrees.   Activity tolerances - he is able to walk for moderately prolonged periods of time without pain.  Slow, guarded transitional movements with vocalizations. Antalgic gait noted within clinic. He has soreness with prologned sitting.  He is able to lift 20# to waist level with mild pain but had significant pain with lifting to shoulder level and was not able to safely return box to floor.     Assessment/Plan  This has been his only visit since his last MD assessment without significant progress outside of improved trunk flexion. Activity tolerances have not improved and pt has difficulty tolerating low level stretches in PT.  Please advise after your exam.     Timed:  Manual Therapy:    -     mins  92356;  Therapeutic Exercise:    25     mins  73641;     Neuromuscular Roxanna:    -    mins  83220;     Therapeutic Activity:     10     mins  78383; (MD assessment)    Gait Training:      -     mins  13750;     Ultrasound:     -     mins  30719;    Iontophoresis                 -     mins 16908  Dry Needling                  -     Min self pay    Untimed:  Electrical Stimulation:    20     mins  83077 ( );  Mech traction                   -     mins 05732    Timed Treatment:   35   mins   Total Treatment:     65   mins    PT Signature: Bety Meraz, CHELSY  [place] License # [id number]

## 2020-01-29 ENCOUNTER — TREATMENT (OUTPATIENT)
Dept: PHYSICAL THERAPY | Facility: CLINIC | Age: 55
End: 2020-01-29

## 2020-01-29 DIAGNOSIS — S29.019D THORACIC MYOFASCIAL STRAIN, SUBSEQUENT ENCOUNTER: Primary | ICD-10-CM

## 2020-01-29 DIAGNOSIS — S39.012D STRAIN OF LUMBAR REGION, SUBSEQUENT ENCOUNTER: ICD-10-CM

## 2020-01-29 PROCEDURE — 97530 THERAPEUTIC ACTIVITIES: CPT | Performed by: PHYSICAL THERAPIST

## 2020-01-29 PROCEDURE — 97110 THERAPEUTIC EXERCISES: CPT | Performed by: PHYSICAL THERAPIST

## 2020-01-29 PROCEDURE — 97014 ELECTRIC STIMULATION THERAPY: CPT | Performed by: PHYSICAL THERAPIST

## 2020-07-06 ENCOUNTER — TRANSCRIBE ORDERS (OUTPATIENT)
Dept: ADMINISTRATIVE | Facility: HOSPITAL | Age: 55
End: 2020-07-06

## 2020-07-06 DIAGNOSIS — R55 BLACKOUT SPELL: Primary | ICD-10-CM

## 2020-07-06 DIAGNOSIS — R05.9 COUGHING: ICD-10-CM

## 2020-07-08 ENCOUNTER — TRANSCRIBE ORDERS (OUTPATIENT)
Dept: ADMINISTRATIVE | Facility: HOSPITAL | Age: 55
End: 2020-07-08

## 2020-07-08 DIAGNOSIS — E78.5 HYPERLIPIDEMIA, UNSPECIFIED HYPERLIPIDEMIA TYPE: Primary | ICD-10-CM

## 2020-07-08 DIAGNOSIS — R42 DIZZINESS: ICD-10-CM

## 2020-07-17 ENCOUNTER — HOSPITAL ENCOUNTER (OUTPATIENT)
Dept: CT IMAGING | Facility: HOSPITAL | Age: 55
Discharge: HOME | End: 2020-07-17
Admitting: ALLERGY & IMMUNOLOGY

## 2020-07-17 ENCOUNTER — HOSPITAL ENCOUNTER (OUTPATIENT)
Dept: CARDIOLOGY | Facility: HOSPITAL | Age: 55
Discharge: HOME | End: 2020-07-17
Admitting: GENERAL PRACTICE

## 2020-07-17 DIAGNOSIS — R42 DIZZINESS: ICD-10-CM

## 2020-07-17 DIAGNOSIS — R05.9 COUGHING: ICD-10-CM

## 2020-07-17 DIAGNOSIS — R55 BLACKOUT SPELL: ICD-10-CM

## 2020-07-17 DIAGNOSIS — E78.5 HYPERLIPIDEMIA, UNSPECIFIED HYPERLIPIDEMIA TYPE: ICD-10-CM

## 2020-07-17 LAB
BH CV XLRA MEAS LEFT DIST CCA EDV: -19.8 CM/SEC
BH CV XLRA MEAS LEFT DIST CCA PSV: -64.2 CM/SEC
BH CV XLRA MEAS LEFT DIST ICA EDV: -29.4 CM/SEC
BH CV XLRA MEAS LEFT DIST ICA PSV: -71.1 CM/SEC
BH CV XLRA MEAS LEFT ICA/CCA RATIO: 1.1
BH CV XLRA MEAS LEFT MID ICA EDV: -27.2 CM/SEC
BH CV XLRA MEAS LEFT MID ICA PSV: -65.4 CM/SEC
BH CV XLRA MEAS LEFT PROX CCA EDV: 13.7 CM/SEC
BH CV XLRA MEAS LEFT PROX CCA PSV: 60.4 CM/SEC
BH CV XLRA MEAS LEFT PROX ECA EDV: -15.4 CM/SEC
BH CV XLRA MEAS LEFT PROX ECA PSV: -77.4 CM/SEC
BH CV XLRA MEAS LEFT PROX ICA EDV: -17.6 CM/SEC
BH CV XLRA MEAS LEFT PROX ICA PSV: -44.3 CM/SEC
BH CV XLRA MEAS LEFT PROX SCLA PSV: 231.9 CM/SEC
BH CV XLRA MEAS LEFT VERTEBRAL A EDV: 8.8 CM/SEC
BH CV XLRA MEAS LEFT VERTEBRAL A PSV: 25.6 CM/SEC
BH CV XLRA MEAS RIGHT DIST CCA EDV: 14.3 CM/SEC
BH CV XLRA MEAS RIGHT DIST CCA PSV: 55.3 CM/SEC
BH CV XLRA MEAS RIGHT DIST ICA EDV: -16.2 CM/SEC
BH CV XLRA MEAS RIGHT DIST ICA PSV: -37.8 CM/SEC
BH CV XLRA MEAS RIGHT ICA/CCA RATIO: 0.99
BH CV XLRA MEAS RIGHT MID ICA EDV: -25.5 CM/SEC
BH CV XLRA MEAS RIGHT MID ICA PSV: -54.5 CM/SEC
BH CV XLRA MEAS RIGHT PROX CCA EDV: 11.3 CM/SEC
BH CV XLRA MEAS RIGHT PROX CCA PSV: 69.3 CM/SEC
BH CV XLRA MEAS RIGHT PROX ECA EDV: -16.8 CM/SEC
BH CV XLRA MEAS RIGHT PROX ECA PSV: -88.8 CM/SEC
BH CV XLRA MEAS RIGHT PROX ICA EDV: -15.7 CM/SEC
BH CV XLRA MEAS RIGHT PROX ICA PSV: -38.4 CM/SEC
BH CV XLRA MEAS RIGHT PROX SCLA PSV: -97.7 CM/SEC
BH CV XLRA MEAS RIGHT VERTEBRAL A EDV: -13.3 CM/SEC
BH CV XLRA MEAS RIGHT VERTEBRAL A PSV: -59.6 CM/SEC
LEFT ARM BP: NORMAL MMHG
RIGHT ARM BP: NORMAL MMHG

## 2020-07-17 PROCEDURE — 93880 EXTRACRANIAL BILAT STUDY: CPT

## 2020-07-17 PROCEDURE — 71250 CT THORAX DX C-: CPT

## 2020-08-04 ENCOUNTER — TRANSCRIBE ORDERS (OUTPATIENT)
Dept: ADMINISTRATIVE | Facility: HOSPITAL | Age: 55
End: 2020-08-04

## 2020-08-04 DIAGNOSIS — R06.09 DOE (DYSPNEA ON EXERTION): Primary | ICD-10-CM

## 2020-08-05 ENCOUNTER — HOSPITAL ENCOUNTER (OUTPATIENT)
Dept: CT IMAGING | Facility: HOSPITAL | Age: 55
Discharge: HOME | End: 2020-08-05
Admitting: INTERNAL MEDICINE

## 2020-08-05 DIAGNOSIS — R06.09 DOE (DYSPNEA ON EXERTION): ICD-10-CM

## 2020-08-05 LAB — CREAT BLDA-MCNC: 1.1 MG/DL (ref 0.6–1.3)

## 2020-08-05 PROCEDURE — 71275 CT ANGIOGRAPHY CHEST: CPT

## 2020-08-05 PROCEDURE — 82565 ASSAY OF CREATININE: CPT

## 2020-08-05 PROCEDURE — 0 IOPAMIDOL PER 1 ML: Performed by: INTERNAL MEDICINE

## 2020-08-05 RX ADMIN — IOPAMIDOL 95 ML: 755 INJECTION, SOLUTION INTRAVENOUS at 09:09

## 2020-08-05 NOTE — NURSING NOTE
Dr. Chaudhry called and stated was a look and let go and patient could go.  Pt aware to follow-up with primary care doctor and IV dc'd. Pt ambulated to main entrance. NAD noted.

## 2020-08-20 ENCOUNTER — TELEPHONE (OUTPATIENT)
Dept: FAMILY MEDICINE CLINIC | Facility: CLINIC | Age: 55
End: 2020-08-20

## 2020-08-20 NOTE — TELEPHONE ENCOUNTER
PATIENT STATES THAT  CAN OBTAIN THE MEDICAL RECORDS FROM DR. HAM TRAORE  [phone].  PLEASE ADVISE    PATIENT CALL BACK # [phone]

## 2020-08-26 ENCOUNTER — OFFICE VISIT (OUTPATIENT)
Dept: FAMILY MEDICINE CLINIC | Facility: CLINIC | Age: 55
End: 2020-08-26

## 2020-08-26 VITALS
OXYGEN SATURATION: 97 % | BODY MASS INDEX: 37.47 KG/M2 | WEIGHT: 247.2 LBS | SYSTOLIC BLOOD PRESSURE: 130 MMHG | HEIGHT: 68 IN | TEMPERATURE: 98.2 F | RESPIRATION RATE: 16 BRPM | HEART RATE: 84 BPM | DIASTOLIC BLOOD PRESSURE: 88 MMHG

## 2020-08-26 DIAGNOSIS — R55 COUGH SYNCOPE SYNDROME: Primary | ICD-10-CM

## 2020-08-26 DIAGNOSIS — R05.4 COUGH SYNCOPE SYNDROME: Primary | ICD-10-CM

## 2020-08-26 DIAGNOSIS — E78.5 HYPERLIPIDEMIA, UNSPECIFIED HYPERLIPIDEMIA TYPE: ICD-10-CM

## 2020-08-26 DIAGNOSIS — E03.9 ACQUIRED HYPOTHYROIDISM: ICD-10-CM

## 2020-08-26 DIAGNOSIS — G47.33 OBSTRUCTIVE SLEEP APNEA: ICD-10-CM

## 2020-08-26 PROBLEM — I10 ESSENTIAL (PRIMARY) HYPERTENSION: Status: ACTIVE | Noted: 2020-08-26

## 2020-08-26 PROBLEM — J44.9 CHRONIC OBSTRUCTIVE PULMONARY DISEASE: Status: ACTIVE | Noted: 2020-08-26

## 2020-08-26 PROBLEM — F90.9 ATTENTION DEFICIT HYPERACTIVITY DISORDER (ADHD): Status: ACTIVE | Noted: 2020-08-26

## 2020-08-26 PROBLEM — F31.9 BIPOLAR DISORDER: Status: ACTIVE | Noted: 2020-08-26

## 2020-08-26 PROCEDURE — 99203 OFFICE O/P NEW LOW 30 MIN: CPT | Performed by: INTERNAL MEDICINE

## 2020-08-26 RX ORDER — LEVOTHYROXINE SODIUM 0.05 MG/1
50 TABLET ORAL DAILY
COMMUNITY
Start: 2020-08-02 | End: 2020-08-26 | Stop reason: SDUPTHER

## 2020-08-26 RX ORDER — LITHIUM CARBONATE 300 MG/1
300 CAPSULE ORAL DAILY
COMMUNITY
Start: 2020-08-25

## 2020-08-26 RX ORDER — DIVALPROEX SODIUM 500 MG/1
500 TABLET, DELAYED RELEASE ORAL
COMMUNITY
Start: 2020-07-19 | End: 2021-04-15

## 2020-08-26 RX ORDER — LANSOPRAZOLE 30 MG/1
30 CAPSULE, DELAYED RELEASE ORAL 2 TIMES DAILY
COMMUNITY
Start: 2020-08-09 | End: 2020-09-23

## 2020-08-26 RX ORDER — ALBUTEROL SULFATE 90 UG/1
AEROSOL, METERED RESPIRATORY (INHALATION)
COMMUNITY
Start: 2020-08-02 | End: 2020-11-20

## 2020-08-26 RX ORDER — LEVOTHYROXINE SODIUM 0.05 MG/1
50 TABLET ORAL DAILY
Qty: 90 TABLET | Refills: 1 | Status: SHIPPED | OUTPATIENT
Start: 2020-08-26 | End: 2021-03-01

## 2020-08-26 RX ORDER — ATORVASTATIN CALCIUM 80 MG/1
80 TABLET, FILM COATED ORAL DAILY
COMMUNITY
End: 2020-08-26 | Stop reason: SDUPTHER

## 2020-08-26 RX ORDER — LOSARTAN POTASSIUM 100 MG/1
100 TABLET ORAL DAILY
COMMUNITY
Start: 2020-06-19 | End: 2020-08-26

## 2020-08-26 RX ORDER — QUETIAPINE FUMARATE 300 MG/1
300 TABLET, FILM COATED ORAL
COMMUNITY
Start: 2020-06-26

## 2020-08-26 RX ORDER — ASPIRIN AND DIPYRIDAMOLE 25; 200 MG/1; MG/1
1 CAPSULE, EXTENDED RELEASE ORAL
COMMUNITY
End: 2020-08-26

## 2020-08-26 RX ORDER — ATORVASTATIN CALCIUM 80 MG/1
80 TABLET, FILM COATED ORAL DAILY
Qty: 90 TABLET | Refills: 1 | Status: SHIPPED | OUTPATIENT
Start: 2020-08-26 | End: 2021-03-01

## 2020-08-26 RX ORDER — BENZONATATE 100 MG/1
100 CAPSULE ORAL 4 TIMES DAILY
Qty: 120 CAPSULE | Refills: 1 | Status: SHIPPED | OUTPATIENT
Start: 2020-08-26 | End: 2021-01-15

## 2020-08-26 RX ORDER — TRAZODONE HYDROCHLORIDE 150 MG/1
150 TABLET ORAL NIGHTLY
COMMUNITY
Start: 2020-08-25

## 2020-08-26 NOTE — PROGRESS NOTES
Subjective Chief complaint is to establish care but also syncope  Reddy Stewart is a 55 y.o. male.     History of Present Illness Paige is here today to establish care.  He was a previous patient of Dr. Glenda Ward.  He does have a prior history of sleep apnea.  He uses a CPAP machine but is significant other still says he snores with that.  He does have a history of hypertension.  He was on some losartan.  That was stopped on 22 July because of a cough.  Apparently the patient has been coughing so hard that he passes out.  He has had 2 separate CAT scans.  One was a regular CT scan of the chest.  There was some motion artifact but no interstitial lung disease was seen.  His lung volumes did appear a little bit low to me on these scans.  The CT angiogram also showed no evidence of any chronic pulmonary emboli.  His carotid Doppler studies were normal.  His girlfriend is present and she reports that he will cough so hard and violently that his face turns red and that he passes out.  When he does this his hands turn white and he shakes a few times.  He is not having the syncopal episodes if he shaves his neck or turns his head.  Past medical history is remarkable for hypothyroidism that was apparently recently diagnosed.  I do not have any laboratories on this and he does not want know what his testing showed beforehand.  He is on some atorvastatin for cholesterol.  He is on 80 mg daily.  He was also previously on some Zetia but that is been discontinued.  He also has bipolar disorder.  He is on Seroquel, trazodone, and lithium carbonate.  He has not been able to complete pulmonary function test because he gets into coughing fits when he tries to do the test.  The following portions of the patient's history were reviewed and updated as appropriate: allergies, current medications, past family history, past medical history, past social history, past surgical history and problem list.    Review of Systems    Constitutional: Negative for chills and fever.   Respiratory: Positive for cough and choking.    Cardiovascular: Negative for chest pain and leg swelling.   Neurological: Positive for syncope.       Objective   Physical Exam   Constitutional: He is oriented to person, place, and time. He appears well-developed and well-nourished.   Cardiovascular: Normal rate, regular rhythm and normal heart sounds.   Pulmonary/Chest: Effort normal and breath sounds normal. He has no wheezes. He has no rales.   Neurological: He is alert and oriented to person, place, and time. No cranial nerve deficit. Coordination normal.   Nursing note and vitals reviewed.        Assessment/Plan   Reddy was seen today for establish care.    Diagnoses and all orders for this visit:    Cough syncope syndrome    Acquired hypothyroidism    Hyperlipidemia, unspecified hyperlipidemia type    Obstructive sleep apnea    Other orders  -     benzonatate (TESSALON) 100 MG capsule; Take 1 capsule by mouth 4 (Four) Times a Day.  -     levothyroxine (SYNTHROID, LEVOTHROID) 50 MCG tablet; Take 1 tablet by mouth Daily. 200001  -     atorvastatin (LIPITOR) 80 MG tablet; Take 1 tablet by mouth Daily.    Paige is here today to establish care.  His biggest problem seems to be cough syncope.  He was on some losartan for approximately 2 years.  He was recently discontinued.  If that is the source of the cough that may take another month before that is fully out of his system.  I am going to prescribe some Tessalon Perles to take 4 times a day regardless of cough.  We will see him back in 1 month to recheck his pressure and see how the cough is doing.  If he is still having the cough at that point time that I do think a bronchoscopy may be in order.

## 2020-09-02 ENCOUNTER — TELEPHONE (OUTPATIENT)
Dept: FAMILY MEDICINE CLINIC | Facility: CLINIC | Age: 55
End: 2020-09-02

## 2020-09-02 RX ORDER — TIZANIDINE 4 MG/1
4 TABLET ORAL NIGHTLY PRN
Qty: 30 TABLET | Refills: 0 | Status: SHIPPED | OUTPATIENT
Start: 2020-09-02 | End: 2020-11-30

## 2020-09-02 NOTE — TELEPHONE ENCOUNTER
Caller: Reddy Stewart    Relationship: Self    Best call back number:[phone] (H)  What medication are you requesting: FLEXERIL 10 MG  What are your current symptoms:   PATIENT SAID HE IS HAVING MUSCLE ACHES FROM A FALL 09/01/20.      Have you had these symptoms before:    [x] Yes  [] No    Have you been treated for these symptoms before:   [x] Yes  [] No    If a prescription is needed, what is your preferred pharmacy and phone number:    [id number] - [address] - [phone]  - [phone]

## 2020-09-02 NOTE — TELEPHONE ENCOUNTER
LAST OV 8/26  DO NOT SEE WHERE YOU PRESCIBED FLEXIRIL BEFORE    The patient reports that he coughed so hard last night that he ended up on the floor.  He does not remember how.  Apparently is now having back pain from this.  I did advise that the cyclobenzaprine really does not mix well with his other medicines will try some tizanidine.  I did advise that the next step would be referral for a bronchoscopy if the current plan is not working.

## 2020-09-04 ENCOUNTER — OFFICE VISIT (OUTPATIENT)
Dept: FAMILY MEDICINE CLINIC | Facility: CLINIC | Age: 55
End: 2020-09-04

## 2020-09-04 VITALS
OXYGEN SATURATION: 98 % | WEIGHT: 247.6 LBS | BODY MASS INDEX: 37.53 KG/M2 | SYSTOLIC BLOOD PRESSURE: 120 MMHG | HEIGHT: 68 IN | TEMPERATURE: 98.7 F | HEART RATE: 82 BPM | DIASTOLIC BLOOD PRESSURE: 80 MMHG

## 2020-09-04 DIAGNOSIS — R55 COUGH SYNCOPE SYNDROME: Primary | ICD-10-CM

## 2020-09-04 DIAGNOSIS — R05.4 COUGH SYNCOPE SYNDROME: Primary | ICD-10-CM

## 2020-09-04 PROCEDURE — 99213 OFFICE O/P EST LOW 20 MIN: CPT | Performed by: INTERNAL MEDICINE

## 2020-09-04 NOTE — PROGRESS NOTES
Subjective Chief complaint is cough and passing out  Reddy Stewart is a 55 y.o. male.     History of Present Illness Paige is here today for coughing and passing out.  We saw him approximately a week ago and prescribed some Tessalon Perles to see if it would break his cycle of coughing.  He has now been off of his losartan for approximately a month and a half.  He is still having a very forceful cough.  He coughs to the point of passing out.  The passing out seems to cause some reflexive myoclonic jerks.  He had an episode in the office today witnessed by the medical assistant.    The following portions of the patient's history were reviewed and updated as appropriate: allergies, current medications, past family history, past medical history, past social history, past surgical history and problem list.    Review of Systems   Respiratory: Positive for cough and choking.    Neurological: Positive for syncope and headache.       Objective   Physical Exam   Cardiovascular: Normal rate and regular rhythm.   Pulmonary/Chest: Effort normal and breath sounds normal. He has no wheezes. He has no rales.         Assessment/Plan   Reddy was seen today for cough.    Diagnoses and all orders for this visit:    Cough syncope syndrome  -     HYDROcod Polst-CPM Polst ER (TUSSIONEX PENNKINETIC) 10-8 MG/5ML ER suspension; Take 5 mL by mouth Every 12 (Twelve) Hours.  -     Ambulatory Referral to Pulmonology      Paige is here today for continuing episodes of cough induced syncope.  I am going to have him continue using the Tessalon Perles.  I am going to add some Tussionex suspension.  Hopefully if he can get a couple of days of this then he can start using the previous Stiolto inhaler.  I am going to refer him to a pulmonologist for possible bronchoscopy.

## 2020-09-08 ENCOUNTER — TELEPHONE (OUTPATIENT)
Dept: FAMILY MEDICINE CLINIC | Facility: CLINIC | Age: 55
End: 2020-09-08

## 2020-09-08 NOTE — TELEPHONE ENCOUNTER
TRIED TO CONTACT PT TO TELL HIM THAT DR RHOADES ADVISED HIM AT HIS VISIT ON 9/4 THAT HE NEEDED TO PAY OUT OF POCKET FOR THIS MEDICATION SINCE HE NEEDED TO START TAKING IT RIGHT AWAY AND IT SENT ME TO [employer]    HUB PLEASE ADVISE/READ

## 2020-09-08 NOTE — TELEPHONE ENCOUNTER
HYDROCODONE POLISTIREX/CH  IS NEEDING A PRIOR AUTHORIZATION FOR THIS MEDICATION SO HIS INSURANCE WILL COVER IT. [employer]JOSE RAMONTMUKUND , THE DR CAN CLICK ON PROVIDERS AND FILL OUT PRE AUTHORIZATION.     [phone]  CALL IF YOU HAVE A QUESTION, [employer] [employer] WITH [employer]

## 2020-09-09 NOTE — TELEPHONE ENCOUNTER
PATIENT IS STATING AGAIN THAT [facility] WILL PAY FOR THIS MEDICATION.  HUB READ WHAT WAS STATED BY [facility] ON 9/8/20 BUT PATIENT STATES THAT MEDICATION WILL BE COVERED BY INSURANCE.  PATIENT STATES THAT HE CANNOT AFFORD TO PURCHASE THE MEDICATION.  PATIENT STATES THAT DOCTOR NEEDS TO SUBMIT A LETTER TO [facility].  PLEASE ADVISE    PATIENT CALL BACK #: [phone]

## 2020-09-10 PROBLEM — R55 COUGH SYNCOPE: Status: ACTIVE | Noted: 2020-09-10

## 2020-09-10 PROBLEM — R05.4 COUGH SYNCOPE: Status: ACTIVE | Noted: 2020-09-10

## 2020-09-10 NOTE — TELEPHONE ENCOUNTER
STILL WAITING ON A RESPONSE FROM [facility].  THIS IS NOT A QUICK PROCESS    HUB PLEASE ADVISE/READ

## 2020-09-10 NOTE — TELEPHONE ENCOUNTER
Patient called again asking why it is taking so long to get pa for this medication     He states he spoke to insurance and they advised it is covered     Patient stated pharmacy advised dr to call them if there is an issue    Pharmacy:  [id number] - [place] [address] - [phone] [facility] [phone] FX [[id number]]    Phone:  [phone] (Home Phone) [phone] (Mobile)

## 2020-09-16 RX ORDER — PROMETHAZINE HYDROCHLORIDE AND CODEINE PHOSPHATE 6.25; 1 MG/5ML; MG/5ML
5 SOLUTION ORAL EVERY 6 HOURS PRN
Qty: 240 ML | Refills: 0 | Status: SHIPPED | OUTPATIENT
Start: 2020-09-16 | End: 2020-09-23 | Stop reason: SDUPTHER

## 2020-09-16 NOTE — TELEPHONE ENCOUNTER
Requesting new cough medicine.    I advised the patient that the alternatives that would be allowed by his insurance company do not have the long-acting chlorpheniramine which is actually the active ingredient stopping the cough and the Tussionex.  I therefore think he is going to spend money on a product there is not going to be beneficial but he wants to go ahead and try it.

## 2020-09-16 NOTE — TELEPHONE ENCOUNTER
PATIENT CALLED STATING THAT HE RECEIVED A LETTER FROM HIS INSURANCE COMPANY ([employer]) AS WELL AS A PHONE CALL FROM SOMEONE AT HIS PHARMACY ([facility] [address] [facility]) THAT HIS INSURANCE WILL NOT COVER THE PRESCRIPTION FOR THE HYDROCOD POLST-CPM POLST ER (TUSSIONEX PENNKINETIC) 10-8 MG/5ML ER. THE PATIENT STATED THAT DR. RHOADES HAD ADVISED HIM TO PAY FOR THIS MEDICATION OUT OF POCKET IF IT IS NOT COVERED BY HIS INSURANCE, BUT THE PATIENT STATED THAT HE IS UNABLE TO AFFORD IT, EVEN WITH [facility]. THE PATIENT STATED THAT THE COST OF THE MEDICATION OUT OF POCKET IS $80.    THE PATIENT REQUESTED FOR DR. RHOADES TO WRITE HIM A PRESCRIPTION FOR AN ALTERNATIVE MEDICATION THAT WILL HAVE THE SAME AFFECT AS THE HYDROCOD POLST-CPM POLST ER (TUSSIONEX PENNKINETIC) 10- 8 MG/5ML ER AND IS COVERED BY HIS INSURANCE.    I CONFIRMED THE CORRECT PHARMACY WITH THE PATIENT TO BE [facility].    IF THERE ARE ANY QUESTIONS OR CONCERNS PLEASE CALL THE PATIENT -[phone] OR THE PHARMACY AT [facility] -[phone].

## 2020-09-23 ENCOUNTER — OFFICE VISIT (OUTPATIENT)
Dept: FAMILY MEDICINE CLINIC | Facility: CLINIC | Age: 55
End: 2020-09-23

## 2020-09-23 VITALS
OXYGEN SATURATION: 99 % | HEIGHT: 68 IN | SYSTOLIC BLOOD PRESSURE: 130 MMHG | RESPIRATION RATE: 20 BRPM | BODY MASS INDEX: 37.13 KG/M2 | HEART RATE: 79 BPM | TEMPERATURE: 97.3 F | DIASTOLIC BLOOD PRESSURE: 90 MMHG | WEIGHT: 245 LBS

## 2020-09-23 DIAGNOSIS — E03.9 ACQUIRED HYPOTHYROIDISM: ICD-10-CM

## 2020-09-23 DIAGNOSIS — R05.4 COUGH SYNCOPE: Primary | ICD-10-CM

## 2020-09-23 DIAGNOSIS — F31.60 BIPOLAR 1 DISORDER, MIXED (HCC): ICD-10-CM

## 2020-09-23 DIAGNOSIS — I10 ESSENTIAL (PRIMARY) HYPERTENSION: ICD-10-CM

## 2020-09-23 DIAGNOSIS — R55 COUGH SYNCOPE: Primary | ICD-10-CM

## 2020-09-23 PROBLEM — R06.83 SNORING: Status: ACTIVE | Noted: 2020-09-23

## 2020-09-23 PROBLEM — J30.2 SEASONAL ALLERGIES: Status: ACTIVE | Noted: 2020-09-23

## 2020-09-23 PROBLEM — J44.9 COPD WITH CHRONIC BRONCHITIS (HCC): Status: ACTIVE | Noted: 2020-09-23

## 2020-09-23 PROBLEM — F41.8 DEPRESSION WITH ANXIETY: Status: ACTIVE | Noted: 2020-09-23

## 2020-09-23 PROBLEM — J44.89 COPD WITH CHRONIC BRONCHITIS: Status: ACTIVE | Noted: 2020-09-23

## 2020-09-23 PROBLEM — F41.0 ANXIETY ATTACK: Status: ACTIVE | Noted: 2020-09-23

## 2020-09-23 PROCEDURE — 99214 OFFICE O/P EST MOD 30 MIN: CPT | Performed by: INTERNAL MEDICINE

## 2020-09-23 RX ORDER — AMLODIPINE BESYLATE 5 MG/1
5 TABLET ORAL DAILY
Qty: 30 TABLET | Refills: 5 | Status: SHIPPED | OUTPATIENT
Start: 2020-09-23 | End: 2021-03-30

## 2020-09-23 RX ORDER — PROMETHAZINE HYDROCHLORIDE AND CODEINE PHOSPHATE 6.25; 1 MG/5ML; MG/5ML
5 SOLUTION ORAL EVERY 6 HOURS PRN
Qty: 473 ML | Refills: 1 | Status: SHIPPED | OUTPATIENT
Start: 2020-09-23 | End: 2021-01-15

## 2020-09-23 NOTE — PROGRESS NOTES
Subjective Complaint is follow-up on cough and passing out  Reddy Stewart is a 55 y.o. male.     History of Present Illness Yosi is here today for follow-up.  Since his last visit we did have trouble getting medicine approved for him.  His insurance would not approve the Tussionex suspension.  We have started him on Phenergan with codeine.  That in combination with Tessalon Perles seem to be lessening his cough.  He had virtually no episodes of coughing yesterday.  He is using both of these 4 times daily.  He now has been off of his losartan for approximately 2 months.  His blood pressure is little bit higher today and may need some medication    The following portions of the patient's history were reviewed and updated as appropriate: allergies, current medications, past family history, past medical history, past social history, past surgical history and problem list.    Review of Systems   Constitutional: Negative for chills and fever.   Respiratory: Positive for cough.    Neurological: Positive for syncope. Negative for dizziness and headache.       Objective   Physical Exam  Vitals signs and nursing note reviewed.   Cardiovascular:      Rate and Rhythm: Normal rate and regular rhythm.   Pulmonary:      Effort: Pulmonary effort is normal.      Breath sounds: No wheezing or rales.   Neurological:      General: No focal deficit present.      Mental Status: He is alert.           Assessment/Plan   Reddy was seen today for syncope.    Diagnoses and all orders for this visit:    Cough syncope  -     promethazine-codeine (PHENERGAN with CODEINE) 6.25-10 MG/5ML solution; Take 5 mL by mouth Every 6 (Six) Hours As Needed for Cough.    Essential (primary) hypertension  -     CBC & Differential  -     Comprehensive Metabolic Panel  -     Lipid Panel    Acquired hypothyroidism  -     TSH+Free T4    Bipolar 1 disorder, mixed (CMS/[facility])  -     Lithium level  -     Valproic Acid Level, Total    Other orders  -     amLODIPine  (NORVASC) 5 MG tablet; Take 1 tablet by mouth Daily.    To him is here today for follow-up.  His cough seems to be lessening.  I am going to continue him on the current regimen of Tessalon Perles and promethazine with codeine.  We may eventually settle on just a codeine tablet so that he is not getting so much promethazine.  I am going to start him on some amlodipine for the blood pressure.

## 2020-09-24 LAB
ALBUMIN SERPL-MCNC: 4.4 G/DL (ref 3.5–5.2)
ALBUMIN/GLOB SERPL: 1.9 G/DL
ALP SERPL-CCNC: 87 U/L (ref 39–117)
ALT SERPL-CCNC: 41 U/L (ref 1–41)
AST SERPL-CCNC: 17 U/L (ref 1–40)
BASOPHILS # BLD AUTO: 0.04 10*3/MM3 (ref 0–0.2)
BASOPHILS NFR BLD AUTO: 0.7 % (ref 0–1.5)
BILIRUB SERPL-MCNC: 0.3 MG/DL (ref 0–1.2)
BUN SERPL-MCNC: 16 MG/DL (ref 6–20)
BUN/CREAT SERPL: 18 (ref 7–25)
CALCIUM SERPL-MCNC: 9.3 MG/DL (ref 8.6–10.5)
CHLORIDE SERPL-SCNC: 106 MMOL/L (ref 98–107)
CHOLEST SERPL-MCNC: 123 MG/DL (ref 0–200)
CO2 SERPL-SCNC: 23.2 MMOL/L (ref 22–29)
CREAT SERPL-MCNC: 0.89 MG/DL (ref 0.76–1.27)
EOSINOPHIL # BLD AUTO: 0.21 10*3/MM3 (ref 0–0.4)
EOSINOPHIL NFR BLD AUTO: 3.7 % (ref 0.3–6.2)
ERYTHROCYTE [DISTWIDTH] IN BLOOD BY AUTOMATED COUNT: 12.3 % (ref 12.3–15.4)
GLOBULIN SER CALC-MCNC: 2.3 GM/DL
GLUCOSE SERPL-MCNC: 116 MG/DL (ref 65–99)
HCT VFR BLD AUTO: 42.4 % (ref 37.5–51)
HDLC SERPL-MCNC: 30 MG/DL (ref 40–60)
HGB BLD-MCNC: 15.1 G/DL (ref 13–17.7)
IMM GRANULOCYTES # BLD AUTO: 0.02 10*3/MM3 (ref 0–0.05)
IMM GRANULOCYTES NFR BLD AUTO: 0.4 % (ref 0–0.5)
LDLC SERPL CALC-MCNC: 26 MG/DL (ref 0–100)
LITHIUM SERPL-SCNC: 0.9 MMOL/L (ref 0.6–1.2)
LYMPHOCYTES # BLD AUTO: 1.75 10*3/MM3 (ref 0.7–3.1)
LYMPHOCYTES NFR BLD AUTO: 31.2 % (ref 19.6–45.3)
MCH RBC QN AUTO: 31.3 PG (ref 26.6–33)
MCHC RBC AUTO-ENTMCNC: 35.6 G/DL (ref 31.5–35.7)
MCV RBC AUTO: 88 FL (ref 79–97)
MONOCYTES # BLD AUTO: 0.57 10*3/MM3 (ref 0.1–0.9)
MONOCYTES NFR BLD AUTO: 10.2 % (ref 5–12)
NEUTROPHILS # BLD AUTO: 3.02 10*3/MM3 (ref 1.7–7)
NEUTROPHILS NFR BLD AUTO: 53.8 % (ref 42.7–76)
NRBC BLD AUTO-RTO: 0 /100 WBC (ref 0–0.2)
PLATELET # BLD AUTO: 202 10*3/MM3 (ref 140–450)
POTASSIUM SERPL-SCNC: 3.8 MMOL/L (ref 3.5–5.2)
PROT SERPL-MCNC: 6.7 G/DL (ref 6–8.5)
RBC # BLD AUTO: 4.82 10*6/MM3 (ref 4.14–5.8)
SODIUM SERPL-SCNC: 142 MMOL/L (ref 136–145)
T4 FREE SERPL-MCNC: 1.1 NG/DL (ref 0.93–1.7)
TRIGL SERPL-MCNC: 335 MG/DL (ref 0–150)
TSH SERPL DL<=0.005 MIU/L-ACNC: 3.35 UIU/ML (ref 0.27–4.2)
VALPROATE SERPL-MCNC: 46 MCG/ML (ref 50–125)
VLDLC SERPL CALC-MCNC: 67 MG/DL (ref 5–40)
WBC # BLD AUTO: 5.61 10*3/MM3 (ref 3.4–10.8)

## 2020-09-28 ENCOUNTER — TELEPHONE (OUTPATIENT)
Dept: FAMILY MEDICINE CLINIC | Facility: CLINIC | Age: 55
End: 2020-09-28

## 2020-09-28 NOTE — TELEPHONE ENCOUNTER
PATIENT IS CALLING TO SEE IF HE CAN GET A MEDICATION CALLED IN. HE HAD A FALL AND WENT TO  ER ON 9/26/2020 AND WAS DISCHARGED SAME DAY. THE PAIN IS SEVERE AND HE CAN NOT SLEEP. WHAT THEY GAVE HIM IN THE HOSPITAL IS NOT WORKING NOW.    PLEASE GIVE HIM A CALL TO  FOLLOW UP AFTER HOSPITAL VISIT. HE IS NOT ALLOWED TO DRIVE.    HE USING THE PHARMACY ON FILE.    BEST PH#: [phone]    I cannot find any information from [facility] [place] in terms of an ER visit or x-rays.  I did advise that with him on the codeine-containing cough medicine I cannot prescribe anything.  I did advise ice and elevation and Advil

## 2020-11-04 ENCOUNTER — TELEPHONE (OUTPATIENT)
Dept: FAMILY MEDICINE CLINIC | Facility: CLINIC | Age: 55
End: 2020-11-04

## 2020-11-04 NOTE — TELEPHONE ENCOUNTER
Hub staff attempted to follow warm transfer process and was unsuccessful     Caller: Reddy Stewart     Relationship to patient: SELF    Best call back number: [phone]     Patient is needing:    MR. STEWART RECEIVED PHONE CALL FROM OFFICE YESTERDAY REGARDING AN APPOINTMENT THAT WAS SCHEDULED FOR 11/4/2020 WITH DR. RHOADES

## 2020-11-11 ENCOUNTER — OFFICE VISIT (OUTPATIENT)
Dept: FAMILY MEDICINE CLINIC | Facility: CLINIC | Age: 55
End: 2020-11-11

## 2020-11-11 DIAGNOSIS — R55 COUGH SYNCOPE: Primary | ICD-10-CM

## 2020-11-11 DIAGNOSIS — R05.4 COUGH SYNCOPE: Primary | ICD-10-CM

## 2020-11-11 PROCEDURE — 99442 PR PHYS/QHP TELEPHONE EVALUATION 11-20 MIN: CPT | Performed by: INTERNAL MEDICINE

## 2020-11-11 RX ORDER — CODEINE SULFATE 15 MG/1
15 TABLET ORAL EVERY 6 HOURS PRN
Qty: 60 TABLET | Refills: 0 | Status: SHIPPED | OUTPATIENT
Start: 2020-11-11 | End: 2020-11-20

## 2020-11-11 RX ORDER — BUDESONIDE AND FORMOTEROL FUMARATE DIHYDRATE 160; 4.5 UG/1; UG/1
AEROSOL RESPIRATORY (INHALATION) DAILY PRN
COMMUNITY
Start: 2020-10-31 | End: 2022-10-10 | Stop reason: SDUPTHER

## 2020-11-11 NOTE — PROGRESS NOTES
Subjective Chief complaint is cough  Reddy Stewart is a 55 y.o. male.This visit has been rescheduled as a phone visit to comply with patient safety concerns in accordance with [facility] recommendations. Total time of discussion was 12 minutes.        History of Present Illness Yosi is following up on cough.  The promethazine with codeine has not really seemed to stop the episodes of coughing.  He is still coughing to the point of syncope or near syncope.  He did see the pulmonologist.  The pulmonologist really cannot do much without pulmonary function test and the patient will not agree to do one because he says doing those triggers his cough.  I did advise the patient that I am really out of options.  I did advise him to contact the [facility] and see if he can be evaluated there.  We did discuss that with his other psychotropic medicines that having the promethazine on board may not be a good thing.  I am just going to prescribe plain codeine.  He does report that his girlfriend has lost her job.  That is making things more stressful and that seems to be triggering his cough more.  The following portions of the patient's history were reviewed and updated as appropriate: allergies, current medications, past family history, past medical history, past social history, past surgical history and problem list.    Review of Systems   Constitutional: Negative for chills and fever.   Respiratory: Positive for cough.    Neurological: Positive for syncope.       Objective   Physical Exam  Pulmonary:      Comments: There is no significant coughing during the time we were on the phone.  Neurological:      Mental Status: He is alert.           Assessment/Plan   There are no diagnoses linked to this encounter.

## 2020-11-13 ENCOUNTER — TELEPHONE (OUTPATIENT)
Dept: FAMILY MEDICINE CLINIC | Facility: CLINIC | Age: 55
End: 2020-11-13

## 2020-11-13 NOTE — TELEPHONE ENCOUNTER
PT IS CALLING IN REQUESTING A MEDICAL BRACELET FOR HIS OWN SAFETY.  PT IS REQUESTING A CALL BACK        PT CALL BACK

## 2020-11-17 ENCOUNTER — TELEPHONE (OUTPATIENT)
Dept: FAMILY MEDICINE CLINIC | Facility: CLINIC | Age: 55
End: 2020-11-17

## 2020-11-17 NOTE — TELEPHONE ENCOUNTER
[facility] NEEDS A PA FOR THE CODEINE.  THEY NEED THE FOLLOWING SPECIFIC INFORMATION:    * TREATMENT PLAN    * ACKNOWLEDGE THAT DOCTOR HAS ADVISED PATIENT ON THE HARMS AND BENEFITS OF USING THE MEDICATION    * URINE AND DRUG SCREEN TEST    * DOCTOR TO ACKNOWLEDGE THAT HE HAS REVIEWED ALL MEDICATION (CONTROLLED AND NOT CONTROLLED) OVER THE LAST 90 DAYS.     CALL BACK NUMBER: [phone]    FAX NUMBER: [phone]

## 2020-11-19 NOTE — TELEPHONE ENCOUNTER
PATIENT CALLING BACK REGARDING PREVIOUS NOTE FOR MEDICATION ORDER      [facility] NEEDS A PA FOR THE CODEINE.  THEY NEED THE FOLLOWING SPECIFIC INFORMATION:    * DOCTOR TO ACKNOWLEDGE THAT HE HAS REVIEWED ALL MEDICATION (CONTROLLED AND NOT CONTROLLED) OVER THE LAST 90 DAYS.      CALL BACK NUMBER: [phone]     FAX NUMBER: [phone]    PLEASE ADVISE PATIENT:  [phone]

## 2020-11-20 ENCOUNTER — OFFICE VISIT (OUTPATIENT)
Dept: NEUROLOGY | Facility: CLINIC | Age: 55
End: 2020-11-20

## 2020-11-20 VITALS
BODY MASS INDEX: 37.44 KG/M2 | DIASTOLIC BLOOD PRESSURE: 90 MMHG | SYSTOLIC BLOOD PRESSURE: 160 MMHG | OXYGEN SATURATION: 99 % | HEIGHT: 68 IN | HEART RATE: 86 BPM | WEIGHT: 247 LBS

## 2020-11-20 DIAGNOSIS — R55 SYNCOPE AND COLLAPSE: Primary | ICD-10-CM

## 2020-11-20 PROCEDURE — 99244 OFF/OP CNSLTJ NEW/EST MOD 40: CPT | Performed by: PSYCHIATRY & NEUROLOGY

## 2020-11-20 RX ORDER — TIOTROPIUM BROMIDE AND OLODATEROL 3.124; 2.736 UG/1; UG/1
SPRAY, METERED RESPIRATORY (INHALATION)
COMMUNITY
End: 2021-04-15

## 2020-11-20 RX ORDER — HYDROXYZINE HYDROCHLORIDE 25 MG/1
25 TABLET, FILM COATED ORAL DAILY
COMMUNITY

## 2020-11-20 NOTE — PROGRESS NOTES
Subjective:     Patient ID: Reddy Stewart is a 55 y.o. male.    Mr. Stewart is a 55-year-old left-handed male with history of asthma, chronic bronchitis, COPD, headaches, hyperlipidemia, hypothyroidism, and sleep apnea who is seen in consultation at the request of Mr. Mayo Del Toro for the evaluation of syncope.  I reviewed the patient's records.  I reviewed the referring physician's note from October 8, 2020.  It reports that the patient was refusing full pulmonary function test due to fear of syncope.  He reports that he always faints.  He continues to cough.  He also has intolerance to CPAP.  Reports that the patient has syncope every time he exerts himself.  Second opinion at [facility] was recommended.  A note from August 4, 2020 reports that he is fainting several times a day due to coughing.  He will be out from 10 to 30 seconds and will shake as he is coming to.  He cannot remember that he fainted.  I reviewed the patient's labs.  TSH on September 23, 2020 was normal.    Symptoms have been on for about 5 months.  Happens 2-3 times a day.  Always starts with coughing.  Sometimes he will lose consciousness.  Sometimes if he stands too quickly, may fall over.  Immediately afterwards, he will start shaking, could be both sides, or just the left or just the left.  This can happen if he is conscious or not.  Afterwards, he will have a headache.  Out for 10-30 seconds.  Wearing a mask makes the symptoms worse.  Feels like a Mac truck has run him over.  Just wants to lay down.  No tongue biting.  Once had associated urinary incontinence.  Has had associated vomiting.  Eyes will get watery.  May go 2 days without one.  The longest has been 4 days.  No warnings.  No known triggers.  Can feel the cough come on and will try to stop it.  Not associated with any med changes.  Can occur in any position.  Is weak afterwards too.  Is whole body.      Occur at a particular time of day? no  Injuries from seizures? Sprained his  right    Risk factors:  Childhood/febrile seizures? no  Head trauma/pathology? no  CNS infections? Meningitis as a baby  Family history of seizures? no    Driving? no  Work? Not currently    The patient had an event during the clinic visit.  The patient was sitting in his chair and started coughing and became red face.  He then fell to the ground onto his side but was able to prop himself up on his arm.  He did have some tremulousness of his upper body.  He remained conscious throughout the entire episode and reported that he fell to the ground purposefully because he felt like he was having an episode and did not want to fall uncontrollably.  He then had to slowly get himself back up after a few minutes.      The following portions of the patient's history were reviewed and updated as appropriate: allergies, current medications, past family history, past medical history, past social history, past surgical history and problem list.    Review of Systems   Constitutional: Negative for activity change, appetite change and fatigue.   HENT: Negative for ear pain, tinnitus and trouble swallowing.    Eyes: Negative for photophobia, pain and visual disturbance.   Respiratory: Positive for cough. Negative for chest tightness and shortness of breath.    Cardiovascular: Negative for chest pain, palpitations and leg swelling.   Endocrine: Negative for cold intolerance, heat intolerance and polydipsia.   Musculoskeletal: Negative for back pain, gait problem and neck pain.   Allergic/Immunologic: Negative for environmental allergies, food allergies and immunocompromised state.   Neurological: Positive for dizziness, seizures, syncope, light-headedness and headaches. Negative for tremors, facial asymmetry, speech difficulty, weakness and numbness.   Hematological: Negative for adenopathy. Does not bruise/bleed easily.   Psychiatric/Behavioral: Positive for behavioral problems. Negative for agitation, confusion, decreased  concentration, dysphoric mood, hallucinations, self-injury, sleep disturbance and suicidal ideas. The patient is nervous/anxious. The patient is not hyperactive.     I reviewed the ROS documented by the MA.  All other systems negative.      Objective:    Neurologic Exam    Physical Exam     **The patient is wearing a mask**  Constitutional:  Vital signs reviewed.  No apparent distress.  Well groomed.  Eyes:  No injection, no icterus.    Respiratory:  Normal effort.  Clear to auscultation bilaterally.  Cardiovascular:  Regular rate and rhythm.  No murmurs.  No carotid bruits. Symmetric radial pulses.  Musculoskeletal: Normal station.  Gait steady.  Normal arm swing.  Patient able to walk on heels and toes.  Tandem gait intact.  Romberg negative.  Muscle tone and bulk normal in the bilateral upper and lower extremities.  Strength is 5/5 in the bilateral upper and lower extremities proximally and distally unless otherwise specified in the neurological exam.  Skin:  No rashes.  Warm, dry, and intact.  Psychiatric:  Good mood.  Normal affect.    Neurologic:  Mental status-  The patient is alert and oriented to person, place and time. Attention/concentration is within normal limits.  Speech is fluent without dysarthria.  The patient is able to name, repeat and follow complex commands without difficulty.  Immediate memory intact.  Patient recalled 2 out of 3 words after 4 minutes.  Fund of knowledge normal.  Cranial nerves- Pupils equally round and reactive to light with intact accomodation.  Visual fields intact.  Extraocular movements intact.  Facial sensation intact.   Hearing intact to finger-rub bilaterally.  SCM and trapezius are 5/5 bilaterally.    Motor-  See musculoskeletal above.  No tremor.  Reflexes- 1+ in the bilateral biceps, brachioradialis, patellar and achilles.  Toes down-going bilaterally.  Sensation- Intact to pinprick and vibration in bilateral upper and lower extremities symmetrically.  Coordination-  Intact to finger tapping and heel knee shin bilaterally.   Gait- See musculoskeletal exam above.       Assessment/Plan:    The patient is a 55-year-old left-handed male with a history of asthma, chronic bronchitis, COPD, headaches, hyperlipidemia, hypothyroidism, and sleep apnea who presents today for the evaluation of syncope.    1.  Syncope-The patient's clinical presentation seems most consistent with tussive syncope.  His case is a bit unusual in the sense that he has persistent unexplained cough.  Seizure seems highly unlikely; however is not out of the realm of possibility.  I would recommend further evaluation in the epilepsy monitoring unit.  The patient would prefer to go to [facility].  If that study is negative for epileptogenic activity, he can likely follow-up with neurology on an as-needed basis.  We can see the patient back after his test to go over the results.       Problems Addressed this Visit     None      Visit Diagnoses     Syncope and collapse    -  Primary    Relevant Orders    EEG Continuous Monitoring With Video      Diagnoses       Codes Comments    Syncope and collapse    -  Primary ICD-10-CM: R55  ICD-9-CM: 780.2

## 2020-11-23 NOTE — TELEPHONE ENCOUNTER
WILL CALL INS IN THE MORNING.  WE HAVE FAXED A LETTER TO THEM AND THEY SHOULD APPROVE THE MEDICINE

## 2020-11-23 NOTE — TELEPHONE ENCOUNTER
PATIENT CALLED BACK AND SAID THAT HE GOT A LETTER THAT [facility] DENIED THE PA FOR HIS CODINE BECAUSE IT WAS NOT MEDICALLY NECESSARY. PLEASE ADVISE.     PATIENT CALL BACK: [phone]

## 2020-11-25 NOTE — TELEPHONE ENCOUNTER
Patient says his insurance is still denying the medication and that he can do an appeal.    Please advise  [phone]

## 2020-11-30 ENCOUNTER — TELEPHONE (OUTPATIENT)
Dept: NEUROLOGY | Facility: CLINIC | Age: 55
End: 2020-11-30

## 2020-11-30 RX ORDER — TIZANIDINE 4 MG/1
TABLET ORAL
Qty: 30 TABLET | Refills: 0 | Status: SHIPPED | OUTPATIENT
Start: 2020-11-30 | End: 2020-11-30 | Stop reason: SDUPTHER

## 2020-11-30 NOTE — TELEPHONE ENCOUNTER
PT CALLING STATING THAT A EEG Continuous Monitoring With Video WAS SUPPOSE TO BE SET UP AND HAS NOT HEARD NOTHING. IF SOMEONE CAN CALL HIM BACK AND LET HIM KNOW THE STATUS OF THIS.     PLEASE CALL HIM BACK -[phone]

## 2020-11-30 NOTE — TELEPHONE ENCOUNTER
I called and s/w Ben at [facility].  He confirmed they received referral. I called and LM with patient stating that [facility] will be calling him to set up his EEG.

## 2020-12-01 RX ORDER — TIZANIDINE 4 MG/1
4 TABLET ORAL NIGHTLY PRN
Qty: 30 TABLET | Refills: 0 | Status: SHIPPED | OUTPATIENT
Start: 2020-12-01 | End: 2020-12-29

## 2020-12-03 ENCOUNTER — TELEPHONE (OUTPATIENT)
Dept: FAMILY MEDICINE CLINIC | Facility: CLINIC | Age: 55
End: 2020-12-03

## 2020-12-03 NOTE — TELEPHONE ENCOUNTER
PATIENT IS REQUESTING A CALL BACK AS HE IS EXPERIENCING PROBLEMS WITH PHARMACY PAYMENT FOR CODEINE WITH SULFATE PRESCRIPTION/PER LAST NOTE IN SYSTEM PATIENT CALLED REQUESTING THE INSURANCE COMPANY BE CALLED TO SEE IF CAN GET MEDICATION APPROVED/WAS FORWARDED TO [place] AND PATIENT IS REQUESTING A CALL BACK TO DISCUSS STATUS    CALLBACK NUMBER: [phone]    PREFERRED PHARMACY: [id number] - [place], [place] - [address] - [phone]  - [phone] FX

## 2020-12-04 NOTE — TELEPHONE ENCOUNTER
PT IS CALLING IN STATING THAT HE HAS NOT HEARD FROM ANYONE REGARDING THE COUGH SYRUP WITH CODEINE. PT STATES THAT HE NEEDS TO BE CALLED BACK REGARDING THIS.  PTS INSURANCE IS [employer] AND THEY NEED TO BE CONTACTED.  PT STATES THAT HE HAS BEEN TRYING TO GET THIS RESOLVED FOR OVER A MONTH NOW.      PT CALL BACK  [phone]  INSURANCE INFO  7  ASK FOR ADOLFO

## 2020-12-08 ENCOUNTER — TELEPHONE (OUTPATIENT)
Dept: FAMILY MEDICINE CLINIC | Facility: CLINIC | Age: 55
End: 2020-12-08

## 2020-12-08 NOTE — TELEPHONE ENCOUNTER
PATIENT INSURANCE [facility] IS CALLING ABOUT A MEDICATION REQUEST THAT  WAS DENIED    THE MEDICATION  WAS  CODEINE SULFATE 50MG    [facility] DENIED THE REQUEST AND IS NEEDING MORE INFORMATION    THE PATIENT HAS TO HAVE A COUGH AND THE DOCTOR NEEDS TO SET A TREATMENT PLAN AND ALSO ADDRESS THE HARM  AND BENEFITS    THE DOCTOR NEEDS TO ADDRESS THE CONTROL AND NON CONTROL OF HIS MEDICATIONS AND PATIENT IS NEEDING A DRUG SCREENING      [facility]   [phone]  [phone]

## 2020-12-29 RX ORDER — TIZANIDINE 4 MG/1
TABLET ORAL
Qty: 30 TABLET | Refills: 0 | Status: SHIPPED | OUTPATIENT
Start: 2020-12-29 | End: 2021-03-01

## 2021-01-15 ENCOUNTER — TELEPHONE (OUTPATIENT)
Dept: FAMILY MEDICINE CLINIC | Facility: CLINIC | Age: 56
End: 2021-01-15

## 2021-01-15 ENCOUNTER — TELEMEDICINE (OUTPATIENT)
Dept: NEUROLOGY | Facility: CLINIC | Age: 56
End: 2021-01-15

## 2021-01-15 DIAGNOSIS — R55 SYNCOPE AND COLLAPSE: Primary | ICD-10-CM

## 2021-01-15 PROCEDURE — 99212 OFFICE O/P EST SF 10 MIN: CPT | Performed by: PSYCHIATRY & NEUROLOGY

## 2021-01-15 NOTE — TELEPHONE ENCOUNTER
PATIENT IS WANTING TO SPEAK TO DR RHOADES ABOUT THE ISSUES HES BEEN HAVING AND PATIENT IS EXTREMELY FRUSTRATED AND WANTS TO GET RE[facility]ERR[facility] HE DISCUSSED WITH DR RHOADES/PATIENT IS COMING IN WITH WIFE ON Wednesday 1.20.21 AND WANTS TO DISCUSS ISSUES WITH DR RHOADES THEN ABOUT GETTING INTO [facility] AND WHAT NEEDS TO BE DONE NEXT/SAW NEUROLOGIST TODAY WHO ALSO WAS UNABLE TO DETERMINE WHAT IS CAUSING PROBLEMS    PATIENT IS UPSET AND WANTS TO DISCUSS ISSUES WITH DR RHOADES    CALLBACK [phone]  Advised the patient that typically the [facility] does not require referral.  He needs to check with his insurance to make sure they cover that.

## 2021-01-15 NOTE — PROGRESS NOTES
I performed this clinical encounter by utilizing a real-time telehealth video connection between my location and the patient's location at home.  I obtained verbal consent from the patient to perform this clinical encounter utilizing video and prepared the patient by answering any questions they had about the telehealth interaction.    Unable to complete visit using a video connection to the patient. A phone visit was used to complete this visits. Total time of discussion was 5 minutes.    CC:  Syncope    HPI:  Patient is a 55-year-old left-handed male with a history of asthma, chronic bronchitis, COPD, headaches, hyperlipidemia, hypothyroidism, and sleep apnea who presents today as an established patient for follow-up for syncope.  The patient was last seen on November 20, 2020.  For details regarding his history, please refer to that note.  I felt like the patient's clinical presentation seem most consistent with tussive syncope.  His case was a bit unusual that he had persistent unexplained cough.  I referred him to [facility]'s epilepsy monitoring unit for spell characterization.  The patient had 4 typical clinical events of interest with no abnormal EEG correlate and therefore it was determined that his episodes were nonepileptic in etiology.  The patient called today to discuss the test results.     A/P: The patient is a 55-year-old left-handed male with history of asthma, chronic bronchitis, COPD, headaches, hyperlipidemia, hypothyroidism, and sleep apnea who presents today for follow-up.    1.  Syncope-the patient continues to have episodes of passing out after coughing.  He has had extensive evaluation.  His epilepsy monitoring unit stay ruled out epileptic causes for his symptoms.  His pulmonologist had recommended a second opinion at the [facility] which I think would be a good idea.  I recommend for him to either be evaluated there or at [facility].  Psychogenic etiologies are also on the  differential.  The patient follow-up with me on an as-needed basis.

## 2021-03-01 RX ORDER — ATORVASTATIN CALCIUM 80 MG/1
TABLET, FILM COATED ORAL
Qty: 90 TABLET | Refills: 0 | Status: SHIPPED | OUTPATIENT
Start: 2021-03-01 | End: 2021-03-30

## 2021-03-01 RX ORDER — LEVOTHYROXINE SODIUM 0.05 MG/1
TABLET ORAL
Qty: 90 TABLET | Refills: 0 | Status: SHIPPED | OUTPATIENT
Start: 2021-03-01 | End: 2021-03-30

## 2021-03-01 RX ORDER — TIZANIDINE 4 MG/1
TABLET ORAL
Qty: 30 TABLET | Refills: 0 | Status: SHIPPED | OUTPATIENT
Start: 2021-03-01 | End: 2021-03-30

## 2021-03-24 ENCOUNTER — BULK ORDERING (OUTPATIENT)
Dept: CASE MANAGEMENT | Facility: OTHER | Age: 56
End: 2021-03-24

## 2021-03-24 DIAGNOSIS — Z23 IMMUNIZATION DUE: ICD-10-CM

## 2021-03-26 ENCOUNTER — TELEPHONE (OUTPATIENT)
Dept: FAMILY MEDICINE CLINIC | Facility: CLINIC | Age: 56
End: 2021-03-26

## 2021-03-26 NOTE — TELEPHONE ENCOUNTER
Called pt and notified him he would need to go to the [employer] to get the form and would need to get it notarized and then drop off here where we can fill it out.

## 2021-03-26 NOTE — TELEPHONE ENCOUNTER
Caller: Reddy Stewart    Relationship: Self    Best call back number: [phone]    What form or medical record are you requesting: HANDICAPPED STICKER FOR CAR    Who is requesting this form or medical record from you: PATIENT      Timeframe paperwork needed: ASAP    Additional notes: PATIENT CALLED AND NEEDS TO KNOW THE PROCESS FOR GETTING A HANDICAPPED STICKER FOR HIS CAR.  PLEASE CALL AND ADVISE ON THE INFORMATION THAT HE MAY NEED FROM DR RHOADES TO APPLY FOR THIS STICKER.

## 2021-03-30 RX ORDER — ATORVASTATIN CALCIUM 80 MG/1
TABLET, FILM COATED ORAL
Qty: 90 TABLET | Refills: 0 | Status: SHIPPED | OUTPATIENT
Start: 2021-03-30 | End: 2021-08-27

## 2021-03-30 RX ORDER — AMLODIPINE BESYLATE 5 MG/1
TABLET ORAL
Qty: 90 TABLET | Refills: 0 | Status: SHIPPED | OUTPATIENT
Start: 2021-03-30 | End: 2021-06-14

## 2021-03-30 RX ORDER — LEVOTHYROXINE SODIUM 0.05 MG/1
TABLET ORAL
Qty: 90 TABLET | Refills: 0 | Status: SHIPPED | OUTPATIENT
Start: 2021-03-30 | End: 2021-05-28

## 2021-03-30 RX ORDER — TIZANIDINE 4 MG/1
TABLET ORAL
Qty: 30 TABLET | Refills: 0 | Status: SHIPPED | OUTPATIENT
Start: 2021-03-30 | End: 2021-04-29

## 2021-04-14 ENCOUNTER — TELEPHONE (OUTPATIENT)
Dept: FAMILY MEDICINE CLINIC | Facility: CLINIC | Age: 56
End: 2021-04-14

## 2021-04-14 NOTE — TELEPHONE ENCOUNTER
PATIENT CALLED IN STATING HE HAD BLOOD WORK DONE AT [facility] LAST WEEK, PATIENT SAID IF THE DOCTOR NEEDS TO VIEW IT, HE WOULD NEED TO REQUEST THE RESULTS.     BEST CALL BACK # [phone]

## 2021-04-14 NOTE — TELEPHONE ENCOUNTER
Called patient and advised him to request lab results from [facility] we can't get results from [facility] that is ordered from another doctor?  Hub please inform patient

## 2021-04-15 ENCOUNTER — OFFICE VISIT (OUTPATIENT)
Dept: FAMILY MEDICINE CLINIC | Facility: CLINIC | Age: 56
End: 2021-04-15

## 2021-04-15 VITALS
HEART RATE: 82 BPM | TEMPERATURE: 97.6 F | OXYGEN SATURATION: 98 % | RESPIRATION RATE: 16 BRPM | HEIGHT: 67 IN | WEIGHT: 255 LBS | BODY MASS INDEX: 40.02 KG/M2 | SYSTOLIC BLOOD PRESSURE: 150 MMHG | DIASTOLIC BLOOD PRESSURE: 100 MMHG

## 2021-04-15 DIAGNOSIS — R06.02 SHORTNESS OF BREATH: ICD-10-CM

## 2021-04-15 DIAGNOSIS — R60.9 EDEMA, UNSPECIFIED TYPE: Primary | ICD-10-CM

## 2021-04-15 PROBLEM — R68.89 SPELLS OF DECREASED ATTENTIVENESS: Status: ACTIVE | Noted: 2020-12-09

## 2021-04-15 PROCEDURE — 99214 OFFICE O/P EST MOD 30 MIN: CPT | Performed by: INTERNAL MEDICINE

## 2021-04-15 RX ORDER — HYDROCODONE BITARTRATE AND ACETAMINOPHEN 5; 325 MG/1; MG/1
1 TABLET ORAL EVERY 6 HOURS PRN
COMMUNITY
Start: 2021-04-12 | End: 2021-09-02

## 2021-04-15 RX ORDER — DIVALPROEX SODIUM 500 MG/1
500 TABLET, EXTENDED RELEASE ORAL
COMMUNITY
Start: 2021-03-30

## 2021-04-15 RX ORDER — TIOTROPIUM BROMIDE INHALATION SPRAY 3.12 UG/1
2 SPRAY, METERED RESPIRATORY (INHALATION)
COMMUNITY
End: 2021-04-27

## 2021-04-15 RX ORDER — BENZONATATE 100 MG/1
100 CAPSULE ORAL
COMMUNITY
End: 2021-04-15

## 2021-04-15 RX ORDER — FUROSEMIDE 20 MG/1
20 TABLET ORAL 2 TIMES DAILY
COMMUNITY
Start: 2021-04-08 | End: 2021-04-27 | Stop reason: SDUPTHER

## 2021-04-15 NOTE — PROGRESS NOTES
Subjective If complaint is leg swelling and fluid on the lungs  Reddy Stewart is a 55 y.o. male.     History of Present Illness Yosi is here today for follow-up.  He went to an emergency room at [facility].  Prior to that he was seen at [facility] care center.  He was complaining of some chest pain he was having leg swelling.  He was sent to the emergency room.  The doctor from the immediate care thought he heard fluid on the lungs in addition to having the leg swelling.  In the emergency room the patient's chest x-ray was normal.  The exam by the ER physician did not note any rales or wheezes.  Patient does have chronic obstructive pulmonary disease and a chronic cough.  He did also have some possible rib fractures on the right-hand side.  He was given some hydrocodone for that.  Advised I would not prescribe that any further.  I did advise that he could probably stop the furosemide and wait and see what his proBNP and echocardiogram look like.  I did advise him to avoid salt intake.    The following portions of the patient's history were reviewed and updated as appropriate: allergies, current medications, past family history, past medical history, past social history, past surgical history and problem list.    Review of Systems   Constitutional: Negative for chills and fever.   Respiratory: Positive for cough and shortness of breath.    Cardiovascular: Positive for leg swelling.       Objective   Physical Exam  Vitals and nursing note reviewed.   Cardiovascular:      Rate and Rhythm: Normal rate and regular rhythm.      Heart sounds: No murmur heard.   No gallop.    Pulmonary:      Breath sounds: No wheezing or rales.   Musculoskeletal:      Comments: He has very minimal pretibial edema.   Neurological:      Mental Status: He is alert.           Assessment/Plan   Diagnoses and all orders for this visit:    1. Edema, unspecified type (Primary)  -     proBNP; Future  -     Adult Transthoracic  Echo Complete W/ Cont if Necessary Per Protocol; Future    2. Shortness of breath  -     proBNP; Future  -     Adult Transthoracic Echo Complete W/ Cont if Necessary Per Protocol; Future      Lazaro is here today for swelling.  I do not find any evidence of pulmonary edema by exam.  I am going to do a proBNP and we are going to get an echocardiogram to see where his right ventricular pressure looks like.

## 2021-04-16 LAB — NT-PROBNP SERPL-MCNC: 7 PG/ML (ref 0–121)

## 2021-04-21 ENCOUNTER — TELEPHONE (OUTPATIENT)
Dept: FAMILY MEDICINE CLINIC | Facility: CLINIC | Age: 56
End: 2021-04-21

## 2021-04-21 NOTE — TELEPHONE ENCOUNTER
Called pt and notified him of what Dr. Shannon stated.  He said he would get in contact with his other doctor and see what they said.

## 2021-04-21 NOTE — TELEPHONE ENCOUNTER
Caller: Reddy Stewart    Relationship: Self    Best call back number: [phone] (H)    What is the best time to reach you: ANYTIME    Who are you requesting to speak with (clinical staff, provider,  specific staff member): DR RHOADES    What was the call regarding: PATIENT WOULD LIKE TO TAKE BLACK SEED OIL AND IS INQUIRING FROM DR RHOADES IF HE COULD TAKE THIS DUE TO HIS MEDICATIONS HE TAKES    PLEASE ADVISE    Do you require a callback: YES

## 2021-04-26 ENCOUNTER — TELEPHONE (OUTPATIENT)
Dept: FAMILY MEDICINE CLINIC | Facility: CLINIC | Age: 56
End: 2021-04-26

## 2021-04-26 NOTE — TELEPHONE ENCOUNTER
PATIENT CALLED AND REQUESTING UPDATE ON STATUS OF ECHO REFERRAL. THE PATIENT WOULD LIKE A CALLBACK.    PATIENT'S CALLBACK: [phone]

## 2021-04-27 ENCOUNTER — TELEPHONE (OUTPATIENT)
Dept: FAMILY MEDICINE CLINIC | Facility: CLINIC | Age: 56
End: 2021-04-27

## 2021-04-27 ENCOUNTER — HOSPITAL ENCOUNTER (EMERGENCY)
Facility: HOSPITAL | Age: 56
Discharge: HOME | End: 2021-04-28
Attending: EMERGENCY MEDICINE | Admitting: EMERGENCY MEDICINE

## 2021-04-27 DIAGNOSIS — R60.0 BILATERAL EDEMA OF LOWER EXTREMITY: Primary | ICD-10-CM

## 2021-04-27 LAB
ANION GAP SERPL CALCULATED.3IONS-SCNC: 11.9 MMOL/L (ref 5–15)
BASOPHILS # BLD AUTO: 0.03 10*3/MM3 (ref 0–0.2)
BASOPHILS NFR BLD AUTO: 0.4 % (ref 0–1.5)
BUN SERPL-MCNC: 16 MG/DL (ref 6–20)
BUN/CREAT SERPL: 14.3 (ref 7–25)
CALCIUM SPEC-SCNC: 9.7 MG/DL (ref 8.6–10.5)
CHLORIDE SERPL-SCNC: 103 MMOL/L (ref 98–107)
CO2 SERPL-SCNC: 26.1 MMOL/L (ref 22–29)
CREAT SERPL-MCNC: 1.12 MG/DL (ref 0.76–1.27)
DEPRECATED RDW RBC AUTO: 39.6 FL (ref 37–54)
EOSINOPHIL # BLD AUTO: 0.13 10*3/MM3 (ref 0–0.4)
EOSINOPHIL NFR BLD AUTO: 1.6 % (ref 0.3–6.2)
ERYTHROCYTE [DISTWIDTH] IN BLOOD BY AUTOMATED COUNT: 13 % (ref 12.3–15.4)
GFR SERPL CREATININE-BSD FRML MDRD: 68 ML/MIN/1.73
GLUCOSE SERPL-MCNC: 127 MG/DL (ref 65–99)
HCT VFR BLD AUTO: 44.5 % (ref 37.5–51)
HGB BLD-MCNC: 16.5 G/DL (ref 13–17.7)
LYMPHOCYTES # BLD AUTO: 2.1 10*3/MM3 (ref 0.7–3.1)
LYMPHOCYTES NFR BLD AUTO: 25.4 % (ref 19.6–45.3)
MCH RBC QN AUTO: 32 PG (ref 26.6–33)
MCHC RBC AUTO-ENTMCNC: 37.1 G/DL (ref 31.5–35.7)
MCV RBC AUTO: 86.4 FL (ref 79–97)
MONOCYTES # BLD AUTO: 0.9 10*3/MM3 (ref 0.1–0.9)
MONOCYTES NFR BLD AUTO: 10.9 % (ref 5–12)
NEUTROPHILS NFR BLD AUTO: 5.07 10*3/MM3 (ref 1.7–7)
NEUTROPHILS NFR BLD AUTO: 61.2 % (ref 42.7–76)
NT-PROBNP SERPL-MCNC: 6.6 PG/ML (ref 0–900)
PLATELET # BLD AUTO: 217 10*3/MM3 (ref 140–450)
PMV BLD AUTO: 9.8 FL (ref 6–12)
POTASSIUM SERPL-SCNC: 3.5 MMOL/L (ref 3.5–5.2)
RBC # BLD AUTO: 5.15 10*6/MM3 (ref 4.14–5.8)
SODIUM SERPL-SCNC: 141 MMOL/L (ref 136–145)
WBC # BLD AUTO: 8.27 10*3/MM3 (ref 3.4–10.8)

## 2021-04-27 PROCEDURE — 80048 BASIC METABOLIC PNL TOTAL CA: CPT | Performed by: EMERGENCY MEDICINE

## 2021-04-27 PROCEDURE — 83880 ASSAY OF NATRIURETIC PEPTIDE: CPT | Performed by: EMERGENCY MEDICINE

## 2021-04-27 PROCEDURE — 99283 EMERGENCY DEPT VISIT LOW MDM: CPT

## 2021-04-27 PROCEDURE — 85025 COMPLETE CBC W/AUTO DIFF WBC: CPT | Performed by: EMERGENCY MEDICINE

## 2021-04-27 NOTE — TELEPHONE ENCOUNTER
Caller: Pat Reddy    Relationship: Self    Best call back number: [phone]    Medication needed:   Requested Prescriptions     Pending Prescriptions Disp Refills   • furosemide (LASIX) 20 MG tablet       Sig: Take 1 tablet by mouth 2 (Two) Times a Day.       When do you need the refill by: ASAP    What additional details did the patient provide when requesting the medication: PATIENTS ANKLES ARE SWELLING AGAIN AND NEEDS A REFILL.    Does the patient have less than a 3 day supply:  [x] Yes  [] No    What is the patient's preferred pharmacy: [id number] - [address] - [phone]  - [phone] FX

## 2021-04-28 ENCOUNTER — TELEPHONE (OUTPATIENT)
Dept: FAMILY MEDICINE CLINIC | Facility: CLINIC | Age: 56
End: 2021-04-28

## 2021-04-28 VITALS
SYSTOLIC BLOOD PRESSURE: 167 MMHG | HEART RATE: 84 BPM | RESPIRATION RATE: 20 BRPM | DIASTOLIC BLOOD PRESSURE: 109 MMHG | TEMPERATURE: 97.4 F | OXYGEN SATURATION: 100 %

## 2021-04-28 RX ORDER — VALSARTAN 160 MG/1
160 TABLET ORAL DAILY
Qty: 30 TABLET | Refills: 5 | Status: SHIPPED | OUTPATIENT
Start: 2021-04-28 | End: 2021-10-27

## 2021-04-28 RX ORDER — FUROSEMIDE 20 MG/1
20 TABLET ORAL 2 TIMES DAILY
Qty: 60 TABLET | Refills: 2 | Status: SHIPPED | OUTPATIENT
Start: 2021-04-28 | End: 2021-09-02

## 2021-04-28 RX ORDER — FUROSEMIDE 20 MG/1
20 TABLET ORAL 2 TIMES DAILY
Qty: 60 TABLET | Refills: 2 | Status: SHIPPED | OUTPATIENT
Start: 2021-04-28 | End: 2021-04-28

## 2021-04-28 NOTE — TELEPHONE ENCOUNTER
Caller: Reddy Stewart    Relationship to patient: Self    Best call back number: [phone]    New or established patient?  [] New  [x] Established    Date of discharge: 04/27/2021    Facility discharged from: [facility]      Diagnosis/Symptoms: SWOLLEN FEET, HIGH BLOOD PRESSURE    Length of stay (If applicable): ONLY FOR A FEW HOURS    THE PATIENT IS UPSET THAT HIS E.R. VISIT DID NOT PRODUCE ANSWERS FOR HIM AND HIS RECENT SYMPTOMS. HIS LATEST BLOOD PRESSURE READING /109 AND HE HAS SWOLLEN FEET. HE STATES THAT HE HAS NOT BEEN EATING A DIET HIGH IN SODIUM. THE PATIENT IS SUFFERING FROM LIMITED MOBILITY DUE TO THE SWELLING. HE WOULD LIKE TO SPEAK TO CLINICAL STAFF REGARDING HIS RECENT E.R. VISIT. HE DID NOT SCHEDULE AN APPOINTMENT - HE WANTED TO SPEAK TO HIS PCP FIRST. PLEASE ADVISE AT THE NUMBER ABOVE.    I advised that he could probably stop the amlodipine it may be contributing some to his swelling.  I am going to put him on another medicine that will have a diuretic in it.

## 2021-04-29 RX ORDER — TIZANIDINE 4 MG/1
TABLET ORAL
Qty: 30 TABLET | Refills: 0 | Status: SHIPPED | OUTPATIENT
Start: 2021-04-29 | End: 2021-05-28

## 2021-05-03 ENCOUNTER — TELEPHONE (OUTPATIENT)
Dept: FAMILY MEDICINE CLINIC | Facility: CLINIC | Age: 56
End: 2021-05-03

## 2021-05-03 NOTE — TELEPHONE ENCOUNTER
Caller: Reddy Stewart    Relationship: Self    Best call back number: [phone]    What medications are you currently taking:   Current Outpatient Medications on File Prior to Visit   Medication Sig Dispense Refill   • amLODIPine (NORVASC) 5 MG tablet TAKE 1 TABLET BY MOUTH EVERY DAY  90 tablet 0   • atorvastatin (LIPITOR) 80 MG tablet TAKE 1 TABLET BY MOUTH EVERY DAY  90 tablet 0   • budesonide-formoterol (SYMBICORT) 160-4.5 MCG/ACT inhaler Daily As Needed. States he is only using as needed.     • divalproex (DEPAKOTE ER) 500 MG 24 hr tablet Take 500 mg by mouth every night at bedtime.     • furosemide (Lasix) 20 MG tablet Take 1 tablet by mouth 2 (Two) Times a Day. 60 tablet 2   • HYDROcodone-acetaminophen (NORCO) 5-325 MG per tablet Take 1 tablet by mouth Every 6 (Six) Hours As Needed.     • hydrOXYzine (ATARAX) 25 MG tablet Take 25 mg by mouth Daily.     • levothyroxine (SYNTHROID, LEVOTHROID) 50 MCG tablet TAKE 1 TABLET BY MOUTH EVERY DAY  90 tablet 0   • lithium carbonate 300 MG capsule      • QUEtiapine (SEROquel) 300 MG tablet Take 300 mg by mouth every night at bedtime.     • tiZANidine (ZANAFLEX) 4 MG tablet TAKE 1 TABLET BY MOUTH EVERY DAY AT NIGHT AS NEEDED FOR MUSCLE SPASMS 30 tablet 0   • traZODone (DESYREL) 150 MG tablet Take 225 mg by mouth Every Night.     • valsartan (DIOVAN) 160 MG tablet Take 1 tablet by mouth Daily. 30 tablet 5     No current facility-administered medications on file prior to visit.        When did you start taking these medications: WEEK    Which medication are you concerned about: valsartan (DIOVAN) 160 MG tablet    Who prescribed you this medication: DR RHOADES    What are your concerns: BURNING LEG PAINS, LEG CRAMPS    How long have you been taking these medications: WEEK    How long have you had these concerns: WEEK    Advised that the side effects were more likely due to the furosemide although it is quite a low dose.  Advised him to stop taking that.  I looked at the  [facility] and [facility] and could not find burning leg pain or leg cramps listed as a side effect.

## 2021-05-18 ENCOUNTER — TELEPHONE (OUTPATIENT)
Dept: FAMILY MEDICINE CLINIC | Facility: CLINIC | Age: 56
End: 2021-05-18

## 2021-05-18 NOTE — TELEPHONE ENCOUNTER
Caller: Reddy Stewart    Relationship: Self    Best call back number:[phone] (H)      What is the best time to reach you: ANYTIME    Who are you requesting to speak with: ANYONE    Do you know the name of the person who called: PATIENT    What was the call regarding: PATIENT  CALLED IN STATING THAT  HE WOULD LIKE TO SEE MD COLTEN RHOADES COULD SEND A REQUEST OVER TO HIS INSURANCE ([employer]) TO SEE IF HE COULD GET A MOBILE SCOOTER SINCE HE CAN NOT DRIVE A CAR. PATIENT ALSO STATED HIS CPAP DRYING OUT HIS MOUTH AND HIS NOSE EVEN WHEN IT IS FULL OF WATER. STATED THAT IT DRYS OUT HIS THROAT SO BAD THAT HE KEEPS A CUP OF WATER BY HIS BED TO HELP. PATIENT WOULD LIKE TO KNOW WHY THIS HAPPENS. PLEASE CALL AND ADVISE.     Do you require a callback: YES

## 2021-05-28 RX ORDER — LEVOTHYROXINE SODIUM 0.05 MG/1
TABLET ORAL
Qty: 90 TABLET | Refills: 1 | Status: SHIPPED | OUTPATIENT
Start: 2021-05-28 | End: 2021-10-11 | Stop reason: SDUPTHER

## 2021-05-28 RX ORDER — TIZANIDINE 4 MG/1
TABLET ORAL
Qty: 30 TABLET | Refills: 0 | Status: SHIPPED | OUTPATIENT
Start: 2021-05-28 | End: 2021-06-28

## 2021-06-01 ENCOUNTER — TELEPHONE (OUTPATIENT)
Dept: FAMILY MEDICINE CLINIC | Facility: CLINIC | Age: 56
End: 2021-06-01

## 2021-06-01 NOTE — TELEPHONE ENCOUNTER
Caller: Reddy Stewart    Relationship: Self    Best call back number: [phone]    What orders are you requesting (i.e. lab or imaging): ELECTRIC CHAIR          Additional notes: PATIENT STATES THAT HE IS WANTING AN ELECTRIC CHAIR DUE TO BEING OUT OF BREATH WHEN WALKING. PATIENT STATES THAT HE IS ON DISABILITY.

## 2021-06-14 ENCOUNTER — TELEPHONE (OUTPATIENT)
Dept: FAMILY MEDICINE CLINIC | Facility: CLINIC | Age: 56
End: 2021-06-14

## 2021-06-14 NOTE — TELEPHONE ENCOUNTER
PATIENT CALLED TO CHECK IF IS SUPPOSED TO TAKE 2 BP MEDS NOW OR JUST ONE. NOT SURE IF VALSARTAN WAS PRESCRIBED TO REPLACE AMLODIPINE OR IN ADDITION TO IT.    PLEASE ADVISE    [phone]

## 2021-06-28 ENCOUNTER — TELEPHONE (OUTPATIENT)
Dept: FAMILY MEDICINE CLINIC | Facility: CLINIC | Age: 56
End: 2021-06-28

## 2021-06-28 RX ORDER — TIZANIDINE 4 MG/1
TABLET ORAL
Qty: 30 TABLET | Refills: 0 | Status: SHIPPED | OUTPATIENT
Start: 2021-06-28 | End: 2021-07-28

## 2021-06-28 RX ORDER — AMLODIPINE BESYLATE 5 MG/1
TABLET ORAL
Qty: 90 TABLET | Refills: 0 | OUTPATIENT
Start: 2021-06-28

## 2021-06-28 NOTE — TELEPHONE ENCOUNTER
Caller: Reddy Stewart    Relationship to patient: Self    Best call back number: [phone]    Patient is needing: THERE WAS A FORM FOR TRANSPORTATION SENT TO THE OFFICE, HE SAYS THAT IT NEEDS TO BE RE-WORDED TO BE CORRECT AND HELP HIM. PLEASE REACH OUT TO PATIENT WITH ANY QUESTIONS ABOUT WHAT IS NEEDING TO BE FIXED.     The patient is claiming he is not allowed to drive because of his cough syncope.  He does have a girlfriend but she works and cannot take him.  He reports that he cannot walk to the bus stop without getting into coughing fits.  I advised him to have the paperwork resent and I will fill it out.

## 2021-06-30 ENCOUNTER — HOSPITAL ENCOUNTER (OUTPATIENT)
Dept: CARDIOLOGY | Facility: HOSPITAL | Age: 56
Discharge: HOME | End: 2021-06-30
Admitting: INTERNAL MEDICINE

## 2021-06-30 VITALS — BODY MASS INDEX: 38.34 KG/M2 | WEIGHT: 253 LBS | HEIGHT: 68 IN | HEART RATE: 75 BPM

## 2021-06-30 DIAGNOSIS — R60.9 EDEMA, UNSPECIFIED TYPE: ICD-10-CM

## 2021-06-30 DIAGNOSIS — R06.02 SHORTNESS OF BREATH: ICD-10-CM

## 2021-06-30 LAB
AORTIC ARCH: 2.6 CM
ASCENDING AORTA: 3.1 CM
BH CV ECHO MEAS - ACS: 2.2 CM
BH CV ECHO MEAS - AO MAX PG: 4 MMHG
BH CV ECHO MEAS - AO MEAN PG: 2 MMHG
BH CV ECHO MEAS - AO ROOT DIAM: 3.8 CM
BH CV ECHO MEAS - AO V2 MAX: 96 CM/SEC
BH CV ECHO MEAS - AO V2 VTI: 17 CM
BH CV ECHO MEAS - BSA(HAYCOCK): 2.4 M^2
BH CV ECHO MEAS - BSA: 2.3 M^2
BH CV ECHO MEAS - BZI_BMI: 38.5 KILOGRAMS/M^2
BH CV ECHO MEAS - BZI_METRIC_HEIGHT: 172.7 CM
BH CV ECHO MEAS - BZI_METRIC_WEIGHT: 114.8 KG
BH CV ECHO MEAS - EF(MOD-BP): 58 %
BH CV ECHO MEAS - IVSD: 1.4 CM
BH CV ECHO MEAS - LAT PEAK E' VEL: 8 CM/SEC
BH CV ECHO MEAS - LV MAX PG: 2 MMHG
BH CV ECHO MEAS - LV MEAN PG: 1 MMHG
BH CV ECHO MEAS - LV V1 MAX: 71 CM/SEC
BH CV ECHO MEAS - LV V1 VTI: 13 CM
BH CV ECHO MEAS - LVIDD: 4.3 CM
BH CV ECHO MEAS - LVIDS: 2.8 CM
BH CV ECHO MEAS - LVOT DIAM: 2.2 CM
BH CV ECHO MEAS - LVPWD: 1.3 CM
BH CV ECHO MEAS - MED PEAK E' VEL: 5 CM/SEC
BH CV ECHO MEAS - MV A MAX VEL: 65 CM/SEC
BH CV ECHO MEAS - MV DEC TIME: 215 MSEC
BH CV ECHO MEAS - MV E MAX VEL: 40 CM/SEC
BH CV ECHO MEAS - MV E/A: 0.6
BH CV ECHO MEAS - MV MAX PG: 2 MMHG
BH CV ECHO MEAS - MV MEAN PG: 1 MMHG
BH CV ECHO MEAS - MV P1/2T: 63 MSEC
BH CV ECHO MEAS - MV V2 VTI: 19 CM
BH CV ECHO MEAS - PA V2 MAX: 69 CM/SEC
BH CV ECHO MEAS - PULM A REVS DUR: 120 SEC
BH CV ECHO MEAS - RV V1 VTI: 9 CM
BH CV ECHO MEAS - RVOT DIAM: 2 CM
BH CV ECHO MEAS - SV(MOD-SP2): 59 ML
BH CV ECHO MEAS - SV(MOD-SP4): 56 ML
BH CV ECHO MEAS - TAPSE (>1.6): 1.7 CM
BH CV ECHO MEASUREMENTS AVERAGE E/E' RATIO: 6.15
BH CV XLRA - RV BASE: 3.3 CM
BH CV XLRA - RV LENGTH: 6.6 CM
BH CV XLRA - RV MID: 3.1 CM
BH CV XLRA - TDI S': 13 CM/SEC
LEFT ATRIUM VOLUME INDEX: 13 ML/M2
LEFT ATRIUM VOLUME: 30 CM3
MAXIMAL PREDICTED HEART RATE: 166 BPM
SINUS: 2.9 CM
STJ: 3 CM
STRESS TARGET HR: 141 BPM

## 2021-06-30 PROCEDURE — 93306 TTE W/DOPPLER COMPLETE: CPT | Performed by: INTERNAL MEDICINE

## 2021-06-30 PROCEDURE — 93306 TTE W/DOPPLER COMPLETE: CPT

## 2021-07-02 ENCOUNTER — TELEPHONE (OUTPATIENT)
Dept: FAMILY MEDICINE CLINIC | Facility: CLINIC | Age: 56
End: 2021-07-02

## 2021-07-02 NOTE — TELEPHONE ENCOUNTER
PATIENT CALLED REGARDING THE FOLLOWING.  AS THIS FORM BEEN FAXED?    Caller: Reddy Stewart     Relationship to patient: Self     Best call back number: [phone]     Patient is needing: THERE WAS A FORM FOR TRANSPORTATION SENT TO THE OFFICE, HE SAYS THAT IT NEEDS TO BE RE-WORDED TO BE CORRECT AND HELP HIM. PLEASE REACH OUT TO PATIENT WITH ANY QUESTIONS ABOUT WHAT IS NEEDING TO BE FIXED.      The patient is claiming he is not allowed to drive because of his cough syncope.  He does have a girlfriend but she works and cannot take him.  He reports that he cannot walk to the bus stop without getting into coughing fits.  I advised him to have the paperwork resent and I will fill it out.

## 2021-07-02 NOTE — TELEPHONE ENCOUNTER
do you recall filling out a transportation form on this patient ? I do not see anything scanned under media .

## 2021-07-06 ENCOUNTER — TELEPHONE (OUTPATIENT)
Dept: FAMILY MEDICINE CLINIC | Facility: CLINIC | Age: 56
End: 2021-07-06

## 2021-07-06 NOTE — TELEPHONE ENCOUNTER
Caller: JOSE BEARD WITH [facility] [facility]    Relationship to patient: Other    Best call back number: [phone]    Patient is needing: JOSE BEARD WITH [employer] [facility] SAID CALLED TO PROVIDE 2 FAX NUMBERS FOR TRANSPORTATION DOCUMENTS TO BE SENT TO. PLEASE FAX DOCUMENTS TO: [phone] OR  [phone]    SHE ALSO PROVIDED HER EMAIL: [email or web address]

## 2021-07-07 NOTE — TELEPHONE ENCOUNTER
Caller: Reddy Stewart    Relationship: Self    Best call back number: [phone]    What is the best time to reach you: ASAP    Who are you requesting to speak with (clinical staff, provider,  specific staff member): CLINICAL STAFF - Craig    Do you require a callback: YES, PLEASE CALL HIM BACK REGARDING TRANSPORTATION PAPERWORK.    ADDING TO PREVIOUS ENCOUNTER

## 2021-07-07 NOTE — TELEPHONE ENCOUNTER
PATIENT CALLING STATES THEY NEVER RECIEVED THE PAPERS FAXED CAN THEY BE FAXED TO THIS NEW NUMBER [phone] ATTN: JOSE   PLEASE CALL PATIENT WHEN DONE

## 2021-07-28 RX ORDER — TIZANIDINE 4 MG/1
TABLET ORAL
Qty: 30 TABLET | Refills: 0 | Status: SHIPPED | OUTPATIENT
Start: 2021-07-28 | End: 2021-08-27

## 2021-07-28 RX ORDER — AMLODIPINE BESYLATE 5 MG/1
TABLET ORAL
Qty: 90 TABLET | Refills: 0 | OUTPATIENT
Start: 2021-07-28

## 2021-08-27 RX ORDER — ATORVASTATIN CALCIUM 80 MG/1
TABLET, FILM COATED ORAL
Qty: 90 TABLET | Refills: 0 | Status: SHIPPED | OUTPATIENT
Start: 2021-08-27 | End: 2021-09-27

## 2021-08-27 RX ORDER — TIZANIDINE 4 MG/1
TABLET ORAL
Qty: 30 TABLET | Refills: 0 | Status: SHIPPED | OUTPATIENT
Start: 2021-08-27 | End: 2021-09-27

## 2021-08-27 NOTE — TELEPHONE ENCOUNTER
Rx Refill Note  Requested Prescriptions     Pending Prescriptions Disp Refills   • tiZANidine (ZANAFLEX) 4 MG tablet [Pharmacy Med Name: tiZANidine HCl Oral Tablet 4 MG] 30 tablet 0     Sig: TAKE 1 TABLET BY MOUTH EVERY DAY AT NIGHT AS NEEDED FOR MUSCLE SPASM      Last office visit with prescribing clinician: 4/15/2021      Next office visit with prescribing clinician: 9/2/2021            Dilan Bell MA  08/27/21, 06:59 EDT

## 2021-09-02 ENCOUNTER — OFFICE VISIT (OUTPATIENT)
Dept: FAMILY MEDICINE CLINIC | Facility: CLINIC | Age: 56
End: 2021-09-02

## 2021-09-02 VITALS
HEART RATE: 92 BPM | WEIGHT: 254 LBS | SYSTOLIC BLOOD PRESSURE: 120 MMHG | HEIGHT: 68 IN | BODY MASS INDEX: 38.49 KG/M2 | OXYGEN SATURATION: 96 % | DIASTOLIC BLOOD PRESSURE: 70 MMHG

## 2021-09-02 DIAGNOSIS — J44.9 CHRONIC OBSTRUCTIVE PULMONARY DISEASE, UNSPECIFIED COPD TYPE (HCC): ICD-10-CM

## 2021-09-02 DIAGNOSIS — F31.9 BIPOLAR AFFECTIVE DISORDER, REMISSION STATUS UNSPECIFIED (HCC): ICD-10-CM

## 2021-09-02 DIAGNOSIS — Z12.5 SCREENING FOR MALIGNANT NEOPLASM OF PROSTATE: ICD-10-CM

## 2021-09-02 DIAGNOSIS — E03.9 ACQUIRED HYPOTHYROIDISM: ICD-10-CM

## 2021-09-02 DIAGNOSIS — Z00.00 ANNUAL PHYSICAL EXAM: Primary | ICD-10-CM

## 2021-09-02 DIAGNOSIS — R55 COUGH SYNCOPE SYNDROME: ICD-10-CM

## 2021-09-02 DIAGNOSIS — R05.3 CHRONIC COUGH: ICD-10-CM

## 2021-09-02 DIAGNOSIS — R05.4 COUGH SYNCOPE SYNDROME: ICD-10-CM

## 2021-09-02 DIAGNOSIS — Z12.11 SCREENING FOR COLON CANCER: ICD-10-CM

## 2021-09-02 PROCEDURE — 99396 PREV VISIT EST AGE 40-64: CPT | Performed by: INTERNAL MEDICINE

## 2021-09-02 NOTE — PROGRESS NOTES
Subjective Chief complaint is annual exam  Reddy Stewart is a 56 y.o. male.     History of Present Illness Lazaro is here today for his annual exam.  Since I last saw him he is still having considerable episodes of cough.  This occurs usually few times a day.  He can still cough to the point of passing out.  We did discuss him going to the [facility] and I gave him information on that.  His blood pressure currently seems to be well controlled on his valsartan.  He does have bipolar disorder.  He is on Depakote as well as some other medications from the psychiatrist.  He also has had some hypothyroidism.  He is not really experiencing any reflux symptoms.  When he first came here we did try him on medicine for reflux but really did not seem to help his cough.  He has not had an EGD or colonoscopy.  He has not had the Covid vaccine and says he will not get it.  He does see a pulmonologist for his chronic obstructive pulmonary disease.  Unfortunately we have not been able to get any pulmonary function test done because he cannot get through them without a coughing fit.  The following portions of the patient's history were reviewed and updated as appropriate: allergies, current medications, past family history, past medical history, past social history, past surgical history and problem list.    Review of Systems   Constitutional: Negative for chills and fever.   Respiratory: Positive for cough.        Objective   Physical Exam  Vitals and nursing note reviewed.   Constitutional:       Appearance: Normal appearance.   HENT:      Head: Normocephalic and atraumatic.      Right Ear: Tympanic membrane and ear canal normal.      Left Ear: Tympanic membrane and ear canal normal.      Nose: No congestion.      Mouth/Throat:      Mouth: Mucous membranes are moist.      Pharynx: Oropharynx is clear.   Eyes:      General: No scleral icterus.     Conjunctiva/sclera: Conjunctivae normal.   Neck:      Thyroid: No thyromegaly.       Vascular: No carotid bruit.   Cardiovascular:      Rate and Rhythm: Normal rate and regular rhythm.      Pulses: Normal pulses.      Heart sounds: No murmur heard.   No friction rub. No gallop.    Pulmonary:      Effort: Pulmonary effort is normal.      Breath sounds: No wheezing, rhonchi or rales.   Abdominal:      General: Abdomen is flat. Bowel sounds are normal.      Tenderness: There is abdominal tenderness.      Comments: He has minimal tenderness in the left lower quadrant.  There is no rebound or guarding   Genitourinary:     Prostate: Normal.      Rectum: Normal.   Musculoskeletal:      Right lower leg: No edema.      Left lower leg: No edema.   Lymphadenopathy:      Cervical: No cervical adenopathy.   Skin:     General: Skin is warm and dry.   Neurological:      General: No focal deficit present.      Mental Status: He is alert.      Coordination: Coordination abnormal.      Comments: Reflexes are symmetrically delayed but improved with augmentation techniques   Psychiatric:         Mood and Affect: Mood normal.           Assessment/Plan   Diagnoses and all orders for this visit:    1. Annual physical exam (Primary)  -     CBC & Differential  -     Comprehensive Metabolic Panel  -     Lipid Panel  -     TSH+Free T4  -     T3, Free    2. Chronic obstructive pulmonary disease, unspecified COPD type (CMS/HCC)    3. Cough syncope syndrome    4. Acquired hypothyroidism    5. Chronic cough  -     Ambulatory Referral to Gastroenterology    6. Screening for colon cancer  -     Ambulatory Referral to Gastroenterology    7. Bipolar affective disorder, remission status unspecified (CMS/HCC)  -     Valproic Acid Level, Total    8. Screening for malignant neoplasm of prostate  -     PSA Screen      Lazaro is here today for his annual physical exam.  Short of the cough syndrome and cough syncope really he seems to be doing okay.  We are going to get him set up for screening colon exam we will see if the gastroenterologist  can also look at his esophagus and see if there is any reason there for his cough.

## 2021-09-03 ENCOUNTER — TELEPHONE (OUTPATIENT)
Dept: FAMILY MEDICINE CLINIC | Facility: CLINIC | Age: 56
End: 2021-09-03

## 2021-09-03 DIAGNOSIS — R05.3 CHRONIC COUGH: ICD-10-CM

## 2021-09-03 DIAGNOSIS — R73.9 ELEVATED BLOOD SUGAR: Primary | ICD-10-CM

## 2021-09-03 DIAGNOSIS — R55 COUGH SYNCOPE SYNDROME: Primary | ICD-10-CM

## 2021-09-03 DIAGNOSIS — R05.4 COUGH SYNCOPE SYNDROME: Primary | ICD-10-CM

## 2021-09-03 LAB
ALBUMIN SERPL-MCNC: 4.8 G/DL (ref 3.5–5.2)
ALBUMIN/GLOB SERPL: 2.2 G/DL
ALP SERPL-CCNC: 103 U/L (ref 39–117)
ALT SERPL-CCNC: 66 U/L (ref 1–41)
AST SERPL-CCNC: 29 U/L (ref 1–40)
BASOPHILS # BLD AUTO: 0.04 10*3/MM3 (ref 0–0.2)
BASOPHILS NFR BLD AUTO: 0.5 % (ref 0–1.5)
BILIRUB SERPL-MCNC: 0.5 MG/DL (ref 0–1.2)
BUN SERPL-MCNC: 13 MG/DL (ref 6–20)
BUN/CREAT SERPL: 14.6 (ref 7–25)
CALCIUM SERPL-MCNC: 9.9 MG/DL (ref 8.6–10.5)
CHLORIDE SERPL-SCNC: 102 MMOL/L (ref 98–107)
CHOLEST SERPL-MCNC: 157 MG/DL (ref 0–200)
CO2 SERPL-SCNC: 23 MMOL/L (ref 22–29)
CREAT SERPL-MCNC: 0.89 MG/DL (ref 0.76–1.27)
EOSINOPHIL # BLD AUTO: 0.11 10*3/MM3 (ref 0–0.4)
EOSINOPHIL NFR BLD AUTO: 1.3 % (ref 0.3–6.2)
ERYTHROCYTE [DISTWIDTH] IN BLOOD BY AUTOMATED COUNT: 12.6 % (ref 12.3–15.4)
GLOBULIN SER CALC-MCNC: 2.2 GM/DL
GLUCOSE SERPL-MCNC: 179 MG/DL (ref 65–99)
HCT VFR BLD AUTO: 49 % (ref 37.5–51)
HDLC SERPL-MCNC: 31 MG/DL (ref 40–60)
HGB BLD-MCNC: 16.2 G/DL (ref 13–17.7)
IMM GRANULOCYTES # BLD AUTO: 0.06 10*3/MM3 (ref 0–0.05)
IMM GRANULOCYTES NFR BLD AUTO: 0.7 % (ref 0–0.5)
LDLC SERPL CALC-MCNC: 41 MG/DL (ref 0–100)
LYMPHOCYTES # BLD AUTO: 2.12 10*3/MM3 (ref 0.7–3.1)
LYMPHOCYTES NFR BLD AUTO: 25.1 % (ref 19.6–45.3)
MCH RBC QN AUTO: 30.4 PG (ref 26.6–33)
MCHC RBC AUTO-ENTMCNC: 33.1 G/DL (ref 31.5–35.7)
MCV RBC AUTO: 91.9 FL (ref 79–97)
MONOCYTES # BLD AUTO: 0.81 10*3/MM3 (ref 0.1–0.9)
MONOCYTES NFR BLD AUTO: 9.6 % (ref 5–12)
NEUTROPHILS # BLD AUTO: 5.3 10*3/MM3 (ref 1.7–7)
NEUTROPHILS NFR BLD AUTO: 62.8 % (ref 42.7–76)
NRBC BLD AUTO-RTO: 0 /100 WBC (ref 0–0.2)
PLATELET # BLD AUTO: 198 10*3/MM3 (ref 140–450)
POTASSIUM SERPL-SCNC: 3.9 MMOL/L (ref 3.5–5.2)
PROT SERPL-MCNC: 7 G/DL (ref 6–8.5)
PSA SERPL-MCNC: 0.22 NG/ML (ref 0–4)
RBC # BLD AUTO: 5.33 10*6/MM3 (ref 4.14–5.8)
SODIUM SERPL-SCNC: 139 MMOL/L (ref 136–145)
T3FREE SERPL-MCNC: 3 PG/ML (ref 2–4.4)
T4 FREE SERPL-MCNC: 1.07 NG/DL (ref 0.93–1.7)
TRIGL SERPL-MCNC: 603 MG/DL (ref 0–150)
TSH SERPL DL<=0.005 MIU/L-ACNC: 1.77 UIU/ML (ref 0.27–4.2)
VALPROATE SERPL-MCNC: 31 MCG/ML (ref 50–125)
VLDLC SERPL CALC-MCNC: 85 MG/DL (ref 5–40)
WBC # BLD AUTO: 8.44 10*3/MM3 (ref 3.4–10.8)

## 2021-09-03 NOTE — TELEPHONE ENCOUNTER
PATIENT IS NEEDING REFERRAL TO [facility]  [phone] FAX NUMBER     ALONG WITH DIAGNOSIS CODE   AND WHAT DEPARTMENT HE NEEDS TO GO TO     HAS  PERSISTENT COUGH AND Syncope      PLEASE ADVISE    Reddy Stewart (Self) [phone] (H)

## 2021-09-10 ENCOUNTER — TELEPHONE (OUTPATIENT)
Dept: FAMILY MEDICINE CLINIC | Facility: CLINIC | Age: 56
End: 2021-09-10

## 2021-09-10 NOTE — TELEPHONE ENCOUNTER
PATIENT IS STATING HIS INSURANCE WONT COVER THE [facility] AND HE NEEDS A NEW REFERRAL TO A NEW PLACE.     CALL: [phone]

## 2021-09-14 ENCOUNTER — TELEPHONE (OUTPATIENT)
Dept: GASTROENTEROLOGY | Facility: CLINIC | Age: 56
End: 2021-09-14

## 2021-09-14 NOTE — TELEPHONE ENCOUNTER
PT RETURNING CALL TO GET GASTRO REF APPT SET UP , WAS CALLED YESTERDAY BUT WAS BUSY AND HAD ASKED TO BE CALLED BACK  BUT DID NOT HEAR BACK.        I CONTACTED SANDRA IN GASTROENTEROLOGY AND WARM TRANSFERRED WITH HER TO FURTHER ASSIST.

## 2021-09-27 RX ORDER — ATORVASTATIN CALCIUM 80 MG/1
TABLET, FILM COATED ORAL
Qty: 90 TABLET | Refills: 0 | Status: SHIPPED | OUTPATIENT
Start: 2021-09-27 | End: 2022-03-28

## 2021-09-27 RX ORDER — TIZANIDINE 4 MG/1
TABLET ORAL
Qty: 30 TABLET | Refills: 0 | Status: SHIPPED | OUTPATIENT
Start: 2021-09-27 | End: 2021-10-27

## 2021-09-27 NOTE — TELEPHONE ENCOUNTER
Rx Refill Note  Requested Prescriptions     Pending Prescriptions Disp Refills   • tiZANidine (ZANAFLEX) 4 MG tablet [Pharmacy Med Name: tiZANidine HCl Oral Tablet 4 MG] 30 tablet 0     Sig: TAKE 1 TABLET BY MOUTH EVERY DAY AT NIGHT AS NEEDED FOR MUSCLE SPASM      Last office visit with prescribing clinician: 9/2/2021      Next office visit with prescribing clinician: Visit date not found            Dilan Bell MA  09/27/21, 07:22 EDT

## 2021-10-11 ENCOUNTER — TELEPHONE (OUTPATIENT)
Dept: GASTROENTEROLOGY | Facility: CLINIC | Age: 56
End: 2021-10-11

## 2021-10-11 ENCOUNTER — OFFICE VISIT (OUTPATIENT)
Dept: GASTROENTEROLOGY | Facility: CLINIC | Age: 56
End: 2021-10-11

## 2021-10-11 VITALS
SYSTOLIC BLOOD PRESSURE: 130 MMHG | BODY MASS INDEX: 38.49 KG/M2 | TEMPERATURE: 96.3 F | HEIGHT: 68 IN | DIASTOLIC BLOOD PRESSURE: 82 MMHG | OXYGEN SATURATION: 92 % | WEIGHT: 254 LBS | HEART RATE: 85 BPM

## 2021-10-11 DIAGNOSIS — R05.3 CHRONIC COUGH: Primary | ICD-10-CM

## 2021-10-11 DIAGNOSIS — Z12.11 ENCOUNTER FOR SCREENING FOR MALIGNANT NEOPLASM OF COLON: ICD-10-CM

## 2021-10-11 PROCEDURE — 99203 OFFICE O/P NEW LOW 30 MIN: CPT | Performed by: INTERNAL MEDICINE

## 2021-10-11 RX ORDER — FUROSEMIDE 20 MG/1
20 TABLET ORAL 2 TIMES DAILY
COMMUNITY
Start: 2021-09-26 | End: 2021-11-29

## 2021-10-11 RX ORDER — LEVOTHYROXINE SODIUM 0.05 MG/1
50 TABLET ORAL DAILY
Qty: 90 TABLET | Refills: 1 | Status: SHIPPED | OUTPATIENT
Start: 2021-10-11 | End: 2022-09-22

## 2021-10-11 RX ORDER — SODIUM CHLORIDE, SODIUM LACTATE, POTASSIUM CHLORIDE, CALCIUM CHLORIDE 600; 310; 30; 20 MG/100ML; MG/100ML; MG/100ML; MG/100ML
30 INJECTION, SOLUTION INTRAVENOUS CONTINUOUS
Status: CANCELLED | OUTPATIENT
Start: 2021-11-03

## 2021-10-11 NOTE — PROGRESS NOTES
Chief Complaint   Patient presents with   • Diarrhea     Subjective   HPI  Reddy Stewart is a 56 y.o. male who presents today for new patient evaluation    Chronic cough - fits to point where he can pass out.  Seems to be worse with wearing mask.  Hx of COPD.  Hasnt seen pulmonologist for his coughing issue.  No typical GERD.  Tried PPI but no improvement.  No prior EGD.  No prior colonoscopy.   Reports some occasional diarrhea since starting Metformin recently.       Objective   Vitals:    10/11/21 1409   BP: 130/82   Pulse: 85   Temp: 96.3 °F (35.7 °C)   SpO2: 92%     Physical Exam  Vitals reviewed.   Constitutional:       Appearance: He is well-developed.   HENT:      Head: Normocephalic and atraumatic.   Neurological:      Mental Status: He is alert and oriented to person, place, and time.   Psychiatric:         Behavior: Behavior normal.         Thought Content: Thought content normal.         Judgment: Judgment normal.              Assessment/Plan   Assessment:     1. Chronic cough    2. Encounter for screening for malignant neoplasm of colon      Plan:   Cough certainly atypical for a GI etiology - sounds more pulmonary in nature but given the patient is in need of average rescreening colonoscopy will plan to perform an EGD at the same time as colonoscopy.            Anthony Dotson M.D.  [facility]  [address] [place] [address] - [place] [address]  Office: [phone]

## 2021-10-11 NOTE — TELEPHONE ENCOUNTER
TAYLOR Riddle for colonoscopy/EGD on 11/03  arrive at 230pm   . Gave Prep instructions in office. ----miralax

## 2021-10-19 ENCOUNTER — CLINICAL SUPPORT (OUTPATIENT)
Dept: FAMILY MEDICINE CLINIC | Facility: CLINIC | Age: 56
End: 2021-10-19

## 2021-10-19 DIAGNOSIS — Z20.822 ENCOUNTER FOR SCREENING LABORATORY TESTING FOR COVID-19 VIRUS IN ASYMPTOMATIC PATIENT: Primary | ICD-10-CM

## 2021-10-20 LAB
LABCORP SARS-COV-2, NAA 2 DAY TAT: NORMAL
SARS-COV-2 RNA RESP QL NAA+PROBE: NOT DETECTED

## 2021-10-27 RX ORDER — VALSARTAN 160 MG/1
TABLET ORAL
Qty: 30 TABLET | Refills: 0 | Status: SHIPPED | OUTPATIENT
Start: 2021-10-27 | End: 2021-11-29

## 2021-10-27 RX ORDER — TIZANIDINE 4 MG/1
TABLET ORAL
Qty: 30 TABLET | Refills: 0 | Status: SHIPPED | OUTPATIENT
Start: 2021-10-27 | End: 2021-11-29

## 2021-10-27 NOTE — TELEPHONE ENCOUNTER
Rx Refill Note  Requested Prescriptions     Pending Prescriptions Disp Refills   • tiZANidine (ZANAFLEX) 4 MG tablet [Pharmacy Med Name: tiZANidine HCl Oral Tablet 4 MG] 30 tablet 0     Sig: TAKE 1 TABLET BY MOUTH EVERY DAY AT NIGHT AS NEEDED FOR MUSCLE SPASM      Last office visit with prescribing clinician: 09/02/2021      Next office visit with prescribing clinician: Visit date not found            Monica Hall MA  10/27/21, 07:37 EDT

## 2021-10-28 ENCOUNTER — TRANSCRIBE ORDERS (OUTPATIENT)
Dept: GASTROENTEROLOGY | Facility: CLINIC | Age: 56
End: 2021-10-28

## 2021-10-28 DIAGNOSIS — Z01.818 OTHER SPECIFIED PRE-OPERATIVE EXAMINATION: Primary | ICD-10-CM

## 2021-11-02 ENCOUNTER — LAB (OUTPATIENT)
Dept: LAB | Facility: HOSPITAL | Age: 56
End: 2021-11-02

## 2021-11-02 DIAGNOSIS — Z01.818 OTHER SPECIFIED PRE-OPERATIVE EXAMINATION: ICD-10-CM

## 2021-11-02 LAB — SARS-COV-2 ORF1AB RESP QL NAA+PROBE: NOT DETECTED

## 2021-11-02 PROCEDURE — U0004 COV-19 TEST NON-CDC HGH THRU: HCPCS

## 2021-11-02 PROCEDURE — C9803 HOPD COVID-19 SPEC COLLECT: HCPCS

## 2021-11-03 ENCOUNTER — ANESTHESIA EVENT (OUTPATIENT)
Dept: GASTROENTEROLOGY | Facility: HOSPITAL | Age: 56
End: 2021-11-03

## 2021-11-03 ENCOUNTER — HOSPITAL ENCOUNTER (OUTPATIENT)
Facility: HOSPITAL | Age: 56
Setting detail: HOSPITAL OUTPATIENT SURGERY
Discharge: HOME | End: 2021-11-03
Attending: INTERNAL MEDICINE | Admitting: INTERNAL MEDICINE

## 2021-11-03 ENCOUNTER — TELEPHONE (OUTPATIENT)
Dept: GASTROENTEROLOGY | Facility: CLINIC | Age: 56
End: 2021-11-03

## 2021-11-03 ENCOUNTER — ANESTHESIA (OUTPATIENT)
Dept: GASTROENTEROLOGY | Facility: HOSPITAL | Age: 56
End: 2021-11-03

## 2021-11-03 VITALS
HEART RATE: 76 BPM | RESPIRATION RATE: 20 BRPM | OXYGEN SATURATION: 96 % | HEIGHT: 68 IN | BODY MASS INDEX: 37.59 KG/M2 | SYSTOLIC BLOOD PRESSURE: 145 MMHG | WEIGHT: 248 LBS | DIASTOLIC BLOOD PRESSURE: 104 MMHG

## 2021-11-03 DIAGNOSIS — Z12.11 ENCOUNTER FOR SCREENING FOR MALIGNANT NEOPLASM OF COLON: ICD-10-CM

## 2021-11-03 DIAGNOSIS — R05.3 CHRONIC COUGH: ICD-10-CM

## 2021-11-03 LAB — GLUCOSE BLDC GLUCOMTR-MCNC: 135 MG/DL (ref 70–130)

## 2021-11-03 PROCEDURE — 45378 DIAGNOSTIC COLONOSCOPY: CPT | Performed by: INTERNAL MEDICINE

## 2021-11-03 PROCEDURE — 88305 TISSUE EXAM BY PATHOLOGIST: CPT | Performed by: INTERNAL MEDICINE

## 2021-11-03 PROCEDURE — 82962 GLUCOSE BLOOD TEST: CPT

## 2021-11-03 PROCEDURE — 25010000002 PROPOFOL 10 MG/ML EMULSION: Performed by: ANESTHESIOLOGY

## 2021-11-03 PROCEDURE — 43239 EGD BIOPSY SINGLE/MULTIPLE: CPT | Performed by: INTERNAL MEDICINE

## 2021-11-03 RX ORDER — LABETALOL HYDROCHLORIDE 5 MG/ML
5 INJECTION, SOLUTION INTRAVENOUS
Status: DISCONTINUED | OUTPATIENT
Start: 2021-11-03 | End: 2021-11-03 | Stop reason: HOSPADM

## 2021-11-03 RX ORDER — OXYCODONE AND ACETAMINOPHEN 10; 325 MG/1; MG/1
1 TABLET ORAL EVERY 4 HOURS PRN
Status: DISCONTINUED | OUTPATIENT
Start: 2021-11-03 | End: 2021-11-03 | Stop reason: HOSPADM

## 2021-11-03 RX ORDER — PROPOFOL 10 MG/ML
VIAL (ML) INTRAVENOUS AS NEEDED
Status: DISCONTINUED | OUTPATIENT
Start: 2021-11-03 | End: 2021-11-03 | Stop reason: SURG

## 2021-11-03 RX ORDER — SODIUM CHLORIDE, SODIUM LACTATE, POTASSIUM CHLORIDE, CALCIUM CHLORIDE 600; 310; 30; 20 MG/100ML; MG/100ML; MG/100ML; MG/100ML
30 INJECTION, SOLUTION INTRAVENOUS CONTINUOUS
Status: DISCONTINUED | OUTPATIENT
Start: 2021-11-03 | End: 2021-11-03 | Stop reason: HOSPADM

## 2021-11-03 RX ORDER — SODIUM CHLORIDE, SODIUM LACTATE, POTASSIUM CHLORIDE, CALCIUM CHLORIDE 600; 310; 30; 20 MG/100ML; MG/100ML; MG/100ML; MG/100ML
30 INJECTION, SOLUTION INTRAVENOUS CONTINUOUS PRN
Status: DISCONTINUED | OUTPATIENT
Start: 2021-11-03 | End: 2021-11-03 | Stop reason: HOSPADM

## 2021-11-03 RX ORDER — HYDRALAZINE HYDROCHLORIDE 20 MG/ML
5 INJECTION INTRAMUSCULAR; INTRAVENOUS
Status: DISCONTINUED | OUTPATIENT
Start: 2021-11-03 | End: 2021-11-03 | Stop reason: HOSPADM

## 2021-11-03 RX ORDER — ONDANSETRON 2 MG/ML
4 INJECTION INTRAMUSCULAR; INTRAVENOUS ONCE AS NEEDED
Status: DISCONTINUED | OUTPATIENT
Start: 2021-11-03 | End: 2021-11-03 | Stop reason: HOSPADM

## 2021-11-03 RX ORDER — DIPHENHYDRAMINE HYDROCHLORIDE 50 MG/ML
12.5 INJECTION INTRAMUSCULAR; INTRAVENOUS
Status: DISCONTINUED | OUTPATIENT
Start: 2021-11-03 | End: 2021-11-03 | Stop reason: HOSPADM

## 2021-11-03 RX ORDER — PROMETHAZINE HYDROCHLORIDE 25 MG/1
25 SUPPOSITORY RECTAL ONCE AS NEEDED
Status: DISCONTINUED | OUTPATIENT
Start: 2021-11-03 | End: 2021-11-03 | Stop reason: HOSPADM

## 2021-11-03 RX ORDER — LIDOCAINE HYDROCHLORIDE 20 MG/ML
INJECTION, SOLUTION INFILTRATION; PERINEURAL AS NEEDED
Status: DISCONTINUED | OUTPATIENT
Start: 2021-11-03 | End: 2021-11-03 | Stop reason: SURG

## 2021-11-03 RX ORDER — HYDROCODONE BITARTRATE AND ACETAMINOPHEN 7.5; 325 MG/1; MG/1
1 TABLET ORAL ONCE AS NEEDED
Status: DISCONTINUED | OUTPATIENT
Start: 2021-11-03 | End: 2021-11-03 | Stop reason: HOSPADM

## 2021-11-03 RX ORDER — IBUPROFEN 600 MG/1
600 TABLET ORAL ONCE AS NEEDED
Status: DISCONTINUED | OUTPATIENT
Start: 2021-11-03 | End: 2021-11-03 | Stop reason: HOSPADM

## 2021-11-03 RX ORDER — DIPHENHYDRAMINE HCL 25 MG
25 CAPSULE ORAL
Status: DISCONTINUED | OUTPATIENT
Start: 2021-11-03 | End: 2021-11-03 | Stop reason: HOSPADM

## 2021-11-03 RX ORDER — FENTANYL CITRATE 50 UG/ML
50 INJECTION, SOLUTION INTRAMUSCULAR; INTRAVENOUS
Status: DISCONTINUED | OUTPATIENT
Start: 2021-11-03 | End: 2021-11-03 | Stop reason: HOSPADM

## 2021-11-03 RX ORDER — FLUMAZENIL 0.1 MG/ML
0.2 INJECTION INTRAVENOUS AS NEEDED
Status: DISCONTINUED | OUTPATIENT
Start: 2021-11-03 | End: 2021-11-03 | Stop reason: HOSPADM

## 2021-11-03 RX ORDER — HYDROMORPHONE HCL 110MG/55ML
0.5 PATIENT CONTROLLED ANALGESIA SYRINGE INTRAVENOUS
Status: DISCONTINUED | OUTPATIENT
Start: 2021-11-03 | End: 2021-11-03 | Stop reason: HOSPADM

## 2021-11-03 RX ORDER — NALOXONE HCL 0.4 MG/ML
0.2 VIAL (ML) INJECTION AS NEEDED
Status: DISCONTINUED | OUTPATIENT
Start: 2021-11-03 | End: 2021-11-03 | Stop reason: HOSPADM

## 2021-11-03 RX ORDER — PROMETHAZINE HYDROCHLORIDE 25 MG/1
25 TABLET ORAL ONCE AS NEEDED
Status: DISCONTINUED | OUTPATIENT
Start: 2021-11-03 | End: 2021-11-03 | Stop reason: HOSPADM

## 2021-11-03 RX ORDER — EPHEDRINE SULFATE 50 MG/ML
5 INJECTION, SOLUTION INTRAVENOUS ONCE AS NEEDED
Status: DISCONTINUED | OUTPATIENT
Start: 2021-11-03 | End: 2021-11-03 | Stop reason: HOSPADM

## 2021-11-03 RX ADMIN — PROPOFOL 100 MG: 10 INJECTION, EMULSION INTRAVENOUS at 14:15

## 2021-11-03 RX ADMIN — SODIUM CHLORIDE, POTASSIUM CHLORIDE, SODIUM LACTATE AND CALCIUM CHLORIDE 30 ML/HR: 600; 310; 30; 20 INJECTION, SOLUTION INTRAVENOUS at 12:53

## 2021-11-03 RX ADMIN — PROPOFOL 140 MCG/KG/MIN: 10 INJECTION, EMULSION INTRAVENOUS at 14:17

## 2021-11-03 RX ADMIN — LIDOCAINE HYDROCHLORIDE 100 MG: 20 INJECTION, SOLUTION INFILTRATION; PERINEURAL at 14:15

## 2021-11-03 RX ADMIN — SODIUM CHLORIDE, POTASSIUM CHLORIDE, SODIUM LACTATE AND CALCIUM CHLORIDE: 600; 310; 30; 20 INJECTION, SOLUTION INTRAVENOUS at 14:15

## 2021-11-03 NOTE — TELEPHONE ENCOUNTER
----- Message from Anca Delarosa RN sent at 11/2/2021  1:18 PM EDT -----  Regarding: FW: colonoscopy tomorrow 11-3-2021  Contact: [phone]    ----- Message -----  From: Dulce Juarez RegSched Rep  Sent: 11/2/2021   1:05 PM EDT  To: Manoj Villa [facility] [place]  Subject: colonoscopy tomorrow 11-3-2021                   Has questions about tomorrow

## 2021-11-03 NOTE — ANESTHESIA PREPROCEDURE EVALUATION
" Anesthesia Evaluation     Patient summary reviewed and Nursing notes reviewed   no history of anesthetic complications:  NPO Solid Status: > 8 hours  NPO Liquid Status: > 2 hours           Airway   Mallampati: II  Dental      Pulmonary    (+) COPD, asthma,sleep apnea,   Cardiovascular   Exercise tolerance: good (4-7 METS)    (+) hypertension, hyperlipidemia,       Neuro/Psych  (+) seizures, headaches, syncope, psychiatric history Bipolar, ADHD and Anxiety,     GI/Hepatic/Renal/Endo    (+)   diabetes mellitus,     Musculoskeletal (-) negative ROS    Abdominal    Substance History - negative use     OB/GYN negative ob/gyn ROS         Other                        Anesthesia Plan    ASA 3     MAC   (/97 (BP Location: Left arm, Patient Position: Lying)   Pulse 86   Resp 12   Ht 172.7 cm (68\")   Wt 112 kg (248 lb)   SpO2 96%   BMI 37.71 kg/m²     I have reviewed the patient's history with the patient and the chart, including all pertinent laboratory results and imaging. I have explained the risks of anesthesia including but not limited to dental damage, corneal abrasion, nerve injury, MI, stroke, and death.    )    Anesthetic plan, all risks, benefits, and alternatives have been provided, discussed and informed consent has been obtained with: patient.      "

## 2021-11-03 NOTE — ANESTHESIA POSTPROCEDURE EVALUATION
"Patient: Reddy Stewart    Procedure Summary     Date: 11/03/21 Room / Location:  [facility] /  [facility]    Anesthesia Start: 1414 Anesthesia Stop: 1442    Procedures:       ESOPHAGOGASTRODUODENOSCOPY with biopsies (N/A Esophagus)      COLONOSCOPY (N/A ) Diagnosis:       Chronic cough      Encounter for screening for malignant neoplasm of colon      (Chronic cough [R05.3])      (Encounter for screening for malignant neoplasm of colon [Z12.11])    Surgeons: Anthony Dotson MD Provider: Chasity Lancaster MD    Anesthesia Type: MAC ASA Status: 3          Anesthesia Type: MAC    Vitals  No vitals data found for the desired time range.          Post Anesthesia Care and Evaluation    Patient location during evaluation: bedside  Patient participation: complete - patient participated  Level of consciousness: awake  Pain management: adequate  Airway patency: patent  Anesthetic complications: No anesthetic complications  PONV Status: controlled  Cardiovascular status: acceptable  Respiratory status: acceptable  Hydration status: acceptable    Comments: /97 (BP Location: Left arm, Patient Position: Lying)   Pulse 86   Resp 12   Ht 172.7 cm (68\")   Wt 112 kg (248 lb)   SpO2 96%   BMI 37.71 kg/m²         "

## 2021-11-04 LAB
LAB AP CASE REPORT: NORMAL
PATH REPORT.FINAL DX SPEC: NORMAL
PATH REPORT.GROSS SPEC: NORMAL

## 2021-11-29 ENCOUNTER — TELEPHONE (OUTPATIENT)
Dept: GASTROENTEROLOGY | Facility: CLINIC | Age: 56
End: 2021-11-29

## 2021-11-29 RX ORDER — FUROSEMIDE 20 MG/1
TABLET ORAL
Qty: 60 TABLET | Refills: 0 | Status: SHIPPED | OUTPATIENT
Start: 2021-11-29 | End: 2022-04-28

## 2021-11-29 RX ORDER — TIZANIDINE 4 MG/1
TABLET ORAL
Qty: 30 TABLET | Refills: 0 | Status: SHIPPED | OUTPATIENT
Start: 2021-11-29 | End: 2021-12-27

## 2021-11-29 RX ORDER — VALSARTAN 160 MG/1
TABLET ORAL
Qty: 30 TABLET | Refills: 0 | Status: SHIPPED | OUTPATIENT
Start: 2021-11-29 | End: 2021-12-27

## 2021-11-29 NOTE — TELEPHONE ENCOUNTER
Called pt and advised of Dr Dotson's note.  Pt verbalized understanding.     Colonoscopy placed in recall for 11/3/2031.

## 2021-11-29 NOTE — TELEPHONE ENCOUNTER
----- Message from Anthony Dotson MD sent at 11/28/2021  7:06 AM EST -----  Minimal gastritis  No esophageal irritation    Office f/u as needed  Recall colon 10 years

## 2021-11-29 NOTE — TELEPHONE ENCOUNTER
Rx Refill Note  Requested Prescriptions     Pending Prescriptions Disp Refills   • tiZANidine (ZANAFLEX) 4 MG tablet [Pharmacy Med Name: tiZANidine HCl Oral Tablet 4 MG] 30 tablet 0     Sig: TAKE 1 TABLET BY MOUTH EVERY DAY AT NIGHT AS NEEDED FOR MUSCLE SPASM      Last office visit with prescribing clinician: Visit date not found      Next office visit with prescribing clinician: Visit date not found            Dilan Bell MA  11/29/21, 07:05 EST

## 2021-12-27 RX ORDER — TIZANIDINE 4 MG/1
TABLET ORAL
Qty: 30 TABLET | Refills: 4 | Status: SHIPPED | OUTPATIENT
Start: 2021-12-27 | End: 2022-10-10 | Stop reason: SDUPTHER

## 2021-12-27 RX ORDER — VALSARTAN 160 MG/1
TABLET ORAL
Qty: 30 TABLET | Refills: 4 | Status: SHIPPED | OUTPATIENT
Start: 2021-12-27 | End: 2022-05-25

## 2022-01-01 RX ORDER — PREDNISONE 10 MG/1
TABLET ORAL
Qty: 39 TABLET | Refills: 0 | Status: SHIPPED | OUTPATIENT
Start: 2022-01-01 | End: 2022-04-28

## 2022-01-01 RX ORDER — AZITHROMYCIN 500 MG/1
500 TABLET, FILM COATED ORAL DAILY
Qty: 5 TABLET | Refills: 0 | Status: SHIPPED | OUTPATIENT
Start: 2022-01-01 | End: 2022-01-06

## 2022-01-04 ENCOUNTER — TELEPHONE (OUTPATIENT)
Dept: FAMILY MEDICINE CLINIC | Facility: CLINIC | Age: 57
End: 2022-01-04

## 2022-01-04 NOTE — TELEPHONE ENCOUNTER
PATIENT STATES: THAT HE WOULD LIKE TO HAVE SOMETHING CALLED IN FOR HIS RIGHT LOWER RIB PAIN HE SAID HE CRACKED IT PLEASE ADVISE      PATIENT CAN BE REACHED ON: [phone]    PHARMACY [id number] - [place], [place] - [facility] [place] [address] - [phone]  - [phone] FX  [phone]    Advised that the tizanidine is not likely going to do much for his rib pain.  Advise using Tylenol and ice and heat.

## 2022-01-05 NOTE — TELEPHONE ENCOUNTER
HE WANTS TO KNOW IF HE SHOULD JUST KEEP TAKING  tiZANidine (ZANAFLEX) 4 MG tablet  FOR THE PAIN    PLEASE REACH OUT AND ADVISE

## 2022-01-05 NOTE — TELEPHONE ENCOUNTER
Called to let pt know we can not call in pain meds without appointment,no answer left ines aceves

## 2022-01-17 ENCOUNTER — OFFICE VISIT (OUTPATIENT)
Dept: FAMILY MEDICINE CLINIC | Facility: CLINIC | Age: 57
End: 2022-01-17

## 2022-01-17 VITALS
SYSTOLIC BLOOD PRESSURE: 134 MMHG | OXYGEN SATURATION: 98 % | HEIGHT: 68 IN | DIASTOLIC BLOOD PRESSURE: 78 MMHG | WEIGHT: 247 LBS | HEART RATE: 89 BPM | BODY MASS INDEX: 37.44 KG/M2

## 2022-01-17 DIAGNOSIS — M79.89 TOE SWELLING: Primary | ICD-10-CM

## 2022-01-17 DIAGNOSIS — L60.0 INGROWN NAIL: ICD-10-CM

## 2022-01-17 DIAGNOSIS — Z23 NEED FOR INFLUENZA VACCINATION: ICD-10-CM

## 2022-01-17 PROCEDURE — 90471 IMMUNIZATION ADMIN: CPT | Performed by: INTERNAL MEDICINE

## 2022-01-17 PROCEDURE — 99213 OFFICE O/P EST LOW 20 MIN: CPT | Performed by: INTERNAL MEDICINE

## 2022-01-17 PROCEDURE — 90686 IIV4 VACC NO PRSV 0.5 ML IM: CPT | Performed by: INTERNAL MEDICINE

## 2022-01-17 RX ORDER — DOXYCYCLINE HYCLATE 100 MG/1
100 CAPSULE ORAL 2 TIMES DAILY
Qty: 14 CAPSULE | Refills: 0 | Status: SHIPPED | OUTPATIENT
Start: 2022-01-17 | End: 2022-10-10

## 2022-01-17 RX ORDER — COLCHICINE 0.6 MG/1
0.6 TABLET ORAL DAILY
Qty: 10 TABLET | Refills: 0 | Status: SHIPPED | OUTPATIENT
Start: 2022-01-17 | End: 2022-03-28

## 2022-01-17 NOTE — PROGRESS NOTES
Subjective Complaint is pain in the big toe  Redyd Stewart is a 56 y.o. male.     History of Present Illness Lazaro is here today for complaints of pain in his big toe.  This has been going on for a week and a half.  He thought it might be an ingrown toenail and he did cut an ingrown toenail out.  However the pain persist.  He is having difficulty bending the toe or bearing weight.  He does not have any prior history of gout but he has been losing weight.  He is doing this intentionally.    The following portions of the patient's history were reviewed and updated as appropriate: allergies, current medications, past family history, past medical history, past social history, past surgical history and problem list.    Review of Systems   Constitutional: Negative for chills and fever.   Musculoskeletal: Positive for gait problem and joint swelling.       Objective   Physical Exam  Vitals and nursing note reviewed.   Constitutional:       Appearance: Normal appearance.   Cardiovascular:      Rate and Rhythm: Normal rate.   Pulmonary:      Effort: Pulmonary effort is normal.   Musculoskeletal:      Comments: There is swelling and tenderness to the right first MTP.  There is not a whole lot of erythema of the joint.  He also has a minimally ingrown nail with mild erythema but no palpable purulence or fluctuance.  I really do not think this is going to be infectious.   Neurological:      Mental Status: He is alert.           Assessment/Plan   Diagnoses and all orders for this visit:    1. Toe swelling (Primary)  -     CBC & Differential  -     Uric Acid  -     C-reactive Protein  -     Sedimentation Rate    2. Ingrown nail  -     CBC & Differential  -     Uric Acid  -     C-reactive Protein  -     Sedimentation Rate    Other orders  -     doxycycline (VIBRAMYCIN) 100 MG capsule; Take 1 capsule by mouth 2 (Two) Times a Day.  Dispense: 14 capsule; Refill: 0  -     colchicine 0.6 MG tablet; Take 1 tablet by mouth Daily.  Dispense:  10 tablet; Refill: 0    Lazaro is here today for swelling in his toe.  I suspect this is going to be gout.  I am going to treat him with some colchicine.  Because of the minor erythema at the medial border of the right great toe nail I am going to give him some doxycycline.

## 2022-01-18 ENCOUNTER — TELEPHONE (OUTPATIENT)
Dept: FAMILY MEDICINE CLINIC | Facility: CLINIC | Age: 57
End: 2022-01-18

## 2022-01-18 LAB
BASOPHILS # BLD AUTO: NORMAL 10*3/UL
CRP SERPL-MCNC: 3 MG/L (ref 0–10)
EOSINOPHIL # BLD AUTO: NORMAL 10*3/UL
EOSINOPHIL NFR BLD AUTO: NORMAL %
ERYTHROCYTE [SEDIMENTATION RATE] IN BLOOD BY WESTERGREN METHOD: NORMAL MM/HR
HCT VFR BLD AUTO: NORMAL %
HGB BLD-MCNC: NORMAL G/DL
LYMPHOCYTES # BLD AUTO: NORMAL 10*3/UL
LYMPHOCYTES NFR BLD AUTO: NORMAL %
MONOCYTES NFR BLD AUTO: NORMAL %
NEUTROPHILS NFR BLD AUTO: NORMAL %
PLATELET # BLD AUTO: NORMAL 10*3/UL
RBC # BLD AUTO: NORMAL 10*6/UL
SPECIMEN STATUS: NORMAL
URATE SERPL-MCNC: 6.5 MG/DL (ref 3.8–8.4)
WBC # BLD AUTO: NORMAL X10E3/UL

## 2022-01-18 NOTE — TELEPHONE ENCOUNTER
PATIENT CALLING IN REGARDS TO REQUEST THAT DR RHOADES CALL IN A PRIOR AUTHORIZATION FOR colchicine 0.6 MG tablet  TO THE PHARMACY BELOW:      [id number] [address] - [phone]  - [phone] FX

## 2022-01-24 ENCOUNTER — TELEPHONE (OUTPATIENT)
Dept: FAMILY MEDICINE CLINIC | Facility: CLINIC | Age: 57
End: 2022-01-24

## 2022-03-02 DIAGNOSIS — F41.8 OTHER SPECIFIED ANXIETY DISORDERS: ICD-10-CM

## 2022-03-02 DIAGNOSIS — F31.9 BIPOLAR AFFECTIVE DISORDER, REMISSION STATUS UNSPECIFIED: Primary | ICD-10-CM

## 2022-03-10 ENCOUNTER — OFFICE VISIT (OUTPATIENT)
Dept: FAMILY MEDICINE CLINIC | Facility: CLINIC | Age: 57
End: 2022-03-10

## 2022-03-10 VITALS
HEART RATE: 97 BPM | HEIGHT: 68 IN | WEIGHT: 250 LBS | BODY MASS INDEX: 37.89 KG/M2 | SYSTOLIC BLOOD PRESSURE: 122 MMHG | OXYGEN SATURATION: 95 % | DIASTOLIC BLOOD PRESSURE: 90 MMHG

## 2022-03-10 DIAGNOSIS — Z48.02 VISIT FOR SUTURE REMOVAL: ICD-10-CM

## 2022-03-10 DIAGNOSIS — S61.011S LACERATION OF RIGHT THUMB WITHOUT FOREIGN BODY WITHOUT DAMAGE TO NAIL, SEQUELA: Primary | ICD-10-CM

## 2022-03-10 PROCEDURE — 99212 OFFICE O/P EST SF 10 MIN: CPT | Performed by: INTERNAL MEDICINE

## 2022-03-10 NOTE — PROGRESS NOTES
Subjective Chief complaint is suture removal  Reddy Stewart is a 56 y.o. male.     History of Present Illness 10 is here today for removal of 5 sutures.  These were placed on March 3.  He had been working on a walking stick and cut himself with a saw.  He did not need tetanus because he was up-to-date on that.    The following portions of the patient's history were reviewed and updated as appropriate: allergies, current medications, past family history, past medical history, past social history, past surgical history and problem list.    Review of Systems   Constitutional: Negative for chills and fever.   Respiratory: Negative for cough.        Objective   Physical Exam  Skin:     Findings: No erythema.      Comments: There is a sutured laceration diagonally across his right thumb.  It is below the PIP joint.  The wound however is not well approximated.  I did remove 5 sutures.  I did place to butterfly bandages.  There was no sign of infection.           Assessment/Plan   Diagnoses and all orders for this visit:    1. Laceration of right thumb without foreign body without damage to nail, sequela (Primary)    2. Visit for suture removal    Lazaro is here today for removal of sutures.  Unfortunately the wound did not approximate well but the sutures been in for 7 days and likely will not do any more good by leaving them.  I did remove the sutures in place to butterfly bandages.

## 2022-03-28 RX ORDER — ATORVASTATIN CALCIUM 80 MG/1
TABLET, FILM COATED ORAL
Qty: 90 TABLET | Refills: 0 | Status: SHIPPED | OUTPATIENT
Start: 2022-03-28 | End: 2022-06-24

## 2022-03-28 NOTE — TELEPHONE ENCOUNTER
Rx Refill Note  Requested Prescriptions     Pending Prescriptions Disp Refills   • atorvastatin (LIPITOR) 80 MG tablet [Pharmacy Med Name: Atorvastatin Calcium Oral Tablet 80 MG] 90 tablet 0     Sig: TAKE 1 TABLET BY MOUTH EVERY DAY      Last office visit with prescribing clinician: 3/10/22     Next office visit with prescribing clinician: Visit date not found            Monica Hall MA  03/28/22, 07:37 EDT

## 2022-04-18 ENCOUNTER — TELEPHONE (OUTPATIENT)
Dept: FAMILY MEDICINE CLINIC | Facility: CLINIC | Age: 57
End: 2022-04-18

## 2022-04-18 NOTE — TELEPHONE ENCOUNTER
Hub please inform patient  Called pt no answer left vm that I called him back to schedule an appointment

## 2022-04-18 NOTE — TELEPHONE ENCOUNTER
Caller: Reddy Stewart    Relationship: Self    Best call back number: [phone]     What medication are you requesting: PAIN MEDICATION, PATIENT STATES HE WANTS THE STRONGEST MEDICATION. PATIENT IS UNABLE TO SLEEP     What are your current symptoms: BACK PAIN       If a prescription is needed, what is your preferred pharmacy and phone number: [id number] - [place], [place] - [facility] - [phone]  - [phone] FX     Additional notes:JEFFERSONT STATES HE WENT TO [facility] AND WAS DIAGNOSED WITH A FEW THINGS (PATIENT DID NOT PROVIDE) . PATIENT STATES HE IS NEEDING A STRONG MEDICATION TO HELP WITH HIS BACK AND THE [facility] STATES THEY DON'T PRESCRIBE ANY MEDICATION.     PATIENT STATES IF THERE ARE ANY QUESTIONS TO GIVE THE [facility]IR[facility]CTOR  A CALL -[phone] (Monday, Wednesday, AND Friday)  & [phone] (Tuesday AND Thursday)

## 2022-04-28 ENCOUNTER — OFFICE VISIT (OUTPATIENT)
Dept: FAMILY MEDICINE CLINIC | Facility: CLINIC | Age: 57
End: 2022-04-28

## 2022-04-28 VITALS
SYSTOLIC BLOOD PRESSURE: 108 MMHG | HEART RATE: 86 BPM | HEIGHT: 68 IN | BODY MASS INDEX: 37.44 KG/M2 | DIASTOLIC BLOOD PRESSURE: 82 MMHG | WEIGHT: 247 LBS | OXYGEN SATURATION: 96 %

## 2022-04-28 DIAGNOSIS — F31.60 BIPOLAR 1 DISORDER, MIXED: ICD-10-CM

## 2022-04-28 DIAGNOSIS — M54.50 THORACOLUMBAR BACK PAIN: Primary | ICD-10-CM

## 2022-04-28 DIAGNOSIS — M47.814 OSTEOARTHRITIS OF THORACIC SPINE, UNSPECIFIED SPINAL OSTEOARTHRITIS COMPLICATION STATUS: ICD-10-CM

## 2022-04-28 DIAGNOSIS — M54.6 THORACOLUMBAR BACK PAIN: Primary | ICD-10-CM

## 2022-04-28 PROCEDURE — 99214 OFFICE O/P EST MOD 30 MIN: CPT | Performed by: INTERNAL MEDICINE

## 2022-04-28 RX ORDER — DICLOFENAC SODIUM 75 MG/1
75 TABLET, DELAYED RELEASE ORAL 2 TIMES DAILY
Qty: 60 TABLET | Refills: 1 | Status: SHIPPED | OUTPATIENT
Start: 2022-04-28 | End: 2022-06-27 | Stop reason: SDUPTHER
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
## 2022-04-29 DIAGNOSIS — F31.60 BIPOLAR 1 DISORDER, MIXED: Primary | ICD-10-CM

## 2022-04-29 LAB — LITHIUM SERPL-SCNC: 0.7 MMOL/L (ref 0.5–1.2)

## 2022-05-25 RX ORDER — VALSARTAN 160 MG/1
TABLET ORAL
Qty: 30 TABLET | Refills: 0 | Status: SHIPPED | OUTPATIENT
Start: 2022-05-25 | End: 2022-06-24

## 2022-06-24 RX ORDER — VALSARTAN 160 MG/1
TABLET ORAL
Qty: 30 TABLET | Refills: 0 | Status: SHIPPED | OUTPATIENT
Start: 2022-06-24 | End: 2022-07-20

## 2022-06-24 RX ORDER — ATORVASTATIN CALCIUM 80 MG/1
TABLET, FILM COATED ORAL
Qty: 90 TABLET | Refills: 0 | Status: SHIPPED | OUTPATIENT
Start: 2022-06-24 | End: 2022-09-22

## 2022-06-27 RX ORDER — DICLOFENAC SODIUM 75 MG/1
75 TABLET, DELAYED RELEASE ORAL 2 TIMES DAILY
Qty: 60 TABLET | Refills: 1 | Status: SHIPPED | OUTPATIENT
Start: 2022-06-27 | End: 2022-08-23

## 2022-06-27 NOTE — TELEPHONE ENCOUNTER
Rx Refill Note  Requested Prescriptions     Pending Prescriptions Disp Refills   • diclofenac (VOLTAREN) 75 MG EC tablet 60 tablet 1     Sig: Take 1 tablet by mouth 2 (Two) Times a Day.      Last office visit with prescribing clinician: 4/28/2022      Next office visit with prescribing clinician: Visit date not found            Dilan Bell MA  06/27/22, 15:49 EDT

## 2022-06-27 NOTE — TELEPHONE ENCOUNTER
Caller: Pat Reddy    Relationship: Self    Best call back number: [phone]   Requested Prescriptions:   Requested Prescriptions     Pending Prescriptions Disp Refills   • diclofenac (VOLTAREN) 75 MG EC tablet 60 tablet 1     Sig: Take 1 tablet by mouth 2 (Two) Times a Day.        Pharmacy where request should be sent: [facility] [address] - [phone]  - [phone] FX     Additional details provided by patient:     OUT OF MEDICATION  Does the patient have less than a 3 day supply:  [x] Yes  [] No    Abi Benjamin Rep   06/27/22 15:35 EDT

## 2022-07-12 ENCOUNTER — TRANSCRIBE ORDERS (OUTPATIENT)
Dept: ADMINISTRATIVE | Facility: HOSPITAL | Age: 57
End: 2022-07-12

## 2022-07-12 ENCOUNTER — HOSPITAL ENCOUNTER (OUTPATIENT)
Dept: GENERAL RADIOLOGY | Facility: HOSPITAL | Age: 57
Discharge: HOME | End: 2022-07-12
Admitting: PHYSICIAN ASSISTANT

## 2022-07-12 DIAGNOSIS — M51.36 DEGENERATION OF LUMBAR INTERVERTEBRAL DISC: ICD-10-CM

## 2022-07-12 PROCEDURE — 72110 X-RAY EXAM L-2 SPINE 4/>VWS: CPT

## 2022-07-20 RX ORDER — VALSARTAN 160 MG/1
TABLET ORAL
Qty: 30 TABLET | Refills: 1 | Status: SHIPPED | OUTPATIENT
Start: 2022-07-20 | End: 2022-09-22

## 2022-08-23 RX ORDER — DICLOFENAC SODIUM 75 MG/1
TABLET, DELAYED RELEASE ORAL
Qty: 60 TABLET | Refills: 0 | Status: SHIPPED | OUTPATIENT
Start: 2022-08-23 | End: 2022-09-22

## 2022-09-22 RX ORDER — LEVOTHYROXINE SODIUM 0.05 MG/1
TABLET ORAL
Qty: 90 TABLET | Refills: 0 | Status: SHIPPED | OUTPATIENT
Start: 2022-09-22 | End: 2022-10-10 | Stop reason: SDUPTHER

## 2022-09-22 RX ORDER — VALSARTAN 160 MG/1
TABLET ORAL
Qty: 30 TABLET | Refills: 0 | Status: SHIPPED | OUTPATIENT
Start: 2022-09-22 | End: 2022-10-10 | Stop reason: SDUPTHER

## 2022-09-22 RX ORDER — ATORVASTATIN CALCIUM 80 MG/1
TABLET, FILM COATED ORAL
Qty: 90 TABLET | Refills: 0 | Status: SHIPPED | OUTPATIENT
Start: 2022-09-22 | End: 2022-10-10 | Stop reason: SDUPTHER

## 2022-09-22 RX ORDER — DICLOFENAC SODIUM 75 MG/1
TABLET, DELAYED RELEASE ORAL
Qty: 60 TABLET | Refills: 0 | Status: SHIPPED | OUTPATIENT
Start: 2022-09-22 | End: 2022-10-10 | Stop reason: SDUPTHER

## 2022-09-22 NOTE — TELEPHONE ENCOUNTER
Rx Refill Note  Requested Prescriptions     Pending Prescriptions Disp Refills   • levothyroxine (SYNTHROID, LEVOTHROID) 50 MCG tablet [Pharmacy Med Name: Levothyroxine Sodium Oral Tablet 50 MCG] 90 tablet 0     Sig: TAKE 1 TABLET BY MOUTH EVERY DAY   • atorvastatin (LIPITOR) 80 MG tablet [Pharmacy Med Name: Atorvastatin Calcium Oral Tablet 80 MG] 90 tablet 0     Sig: TAKE 1 TABLET BY MOUTH EVERY DAY   • valsartan (DIOVAN) 160 MG tablet [Pharmacy Med Name: Valsartan Oral Tablet 160 MG] 30 tablet 0     Sig: TAKE 1 TABLET BY MOUTH EVERY DAY   • diclofenac (VOLTAREN) 75 MG EC tablet [Pharmacy Med Name: Diclofenac Sodium Oral Tablet Delayed Release 75 MG] 60 tablet 0     Sig: TAKE 1 TABLET BY MOUTH TWO TIMES A DAY      Last office visit with prescribing clinician: 4/28/2022      Next office visit with prescribing clinician: Visit date not found            Dilan Bell MA  09/22/22, 07:15 EDT

## 2022-10-10 ENCOUNTER — TELEPHONE (OUTPATIENT)
Dept: FAMILY MEDICINE CLINIC | Facility: CLINIC | Age: 57
End: 2022-10-10

## 2022-10-10 RX ORDER — DIVALPROEX SODIUM 500 MG/1
500 TABLET, EXTENDED RELEASE ORAL
OUTPATIENT
Start: 2022-10-10

## 2022-10-10 RX ORDER — BUDESONIDE AND FORMOTEROL FUMARATE DIHYDRATE 160; 4.5 UG/1; UG/1
2 AEROSOL RESPIRATORY (INHALATION) DAILY
Qty: 30 G | Refills: 1 | Status: SHIPPED | OUTPATIENT
Start: 2022-10-10 | End: 2023-01-27 | Stop reason: SDUPTHER

## 2022-10-10 RX ORDER — VALSARTAN 160 MG/1
160 TABLET ORAL DAILY
Qty: 90 TABLET | Refills: 1 | Status: SHIPPED | OUTPATIENT
Start: 2022-10-10 | End: 2023-01-27 | Stop reason: SDUPTHER

## 2022-10-10 RX ORDER — LEVOTHYROXINE SODIUM 0.05 MG/1
50 TABLET ORAL DAILY
Qty: 90 TABLET | Refills: 1 | Status: SHIPPED | OUTPATIENT
Start: 2022-10-10 | End: 2023-01-27 | Stop reason: SDUPTHER

## 2022-10-10 RX ORDER — TIZANIDINE 4 MG/1
4 TABLET ORAL NIGHTLY PRN
Qty: 90 TABLET | Refills: 1 | Status: SHIPPED | OUTPATIENT
Start: 2022-10-10 | End: 2023-01-27 | Stop reason: SDUPTHER

## 2022-10-10 RX ORDER — DICLOFENAC SODIUM 75 MG/1
75 TABLET, DELAYED RELEASE ORAL 2 TIMES DAILY
Qty: 180 TABLET | Refills: 0 | Status: SHIPPED | OUTPATIENT
Start: 2022-10-10 | End: 2023-01-23

## 2022-10-10 RX ORDER — HYDROXYZINE HYDROCHLORIDE 25 MG/1
25 TABLET, FILM COATED ORAL DAILY
OUTPATIENT
Start: 2022-10-10

## 2022-10-10 RX ORDER — ATORVASTATIN CALCIUM 80 MG/1
80 TABLET, FILM COATED ORAL DAILY
Qty: 90 TABLET | Refills: 1 | Status: SHIPPED | OUTPATIENT
Start: 2022-10-10 | End: 2023-01-23

## 2022-10-10 RX ORDER — DOXYCYCLINE HYCLATE 100 MG/1
100 CAPSULE ORAL 2 TIMES DAILY
Qty: 14 CAPSULE | Refills: 0 | Status: CANCELLED | OUTPATIENT
Start: 2022-10-10

## 2022-10-10 RX ORDER — LITHIUM CARBONATE 300 MG/1
300 CAPSULE ORAL DAILY
OUTPATIENT
Start: 2022-10-10

## 2022-10-10 RX ORDER — TRAZODONE HYDROCHLORIDE 150 MG/1
150 TABLET ORAL NIGHTLY
OUTPATIENT
Start: 2022-10-10

## 2022-10-10 RX ORDER — QUETIAPINE FUMARATE 300 MG/1
300 TABLET, FILM COATED ORAL
OUTPATIENT
Start: 2022-10-10

## 2022-10-10 NOTE — TELEPHONE ENCOUNTER
Caller: Reddy Stewart    Relationship: Self    Best call back number: [phone]    Requested Prescriptions:   Requested Prescriptions     Pending Prescriptions Disp Refills   • atorvastatin (LIPITOR) 80 MG tablet 90 tablet 0     Sig: Take 1 tablet by mouth Daily.   • diclofenac (VOLTAREN) 75 MG EC tablet 60 tablet 0     Sig: Take 1 tablet by mouth 2 (Two) Times a Day.   • levothyroxine (SYNTHROID, LEVOTHROID) 50 MCG tablet 90 tablet 0     Sig: Take 1 tablet by mouth Daily.   • tiZANidine (ZANAFLEX) 4 MG tablet 30 tablet 4   • valsartan (DIOVAN) 160 MG tablet 30 tablet 0     Sig: Take 1 tablet by mouth Daily.   • doxycycline (VIBRAMYCIN) 100 MG capsule 14 capsule 0     Sig: Take 1 capsule by mouth 2 (Two) Times a Day.   • traZODone (DESYREL) 150 MG tablet       Sig: Take 1 tablet by mouth Every Night.   • hydrOXYzine (ATARAX) 25 MG tablet       Sig: Take 1 tablet by mouth Daily.   • lithium carbonate 300 MG capsule       Sig: Take 1 capsule by mouth Daily.   • QUEtiapine (SEROquel) 300 MG tablet       Sig: Take 1 tablet by mouth every night at bedtime.   • metFORMIN (Glucophage) 500 MG tablet 60 tablet 5     Sig: Take 1 tablet by mouth 2 (Two) Times a Day With Meals.   • divalproex (DEPAKOTE ER) 500 MG 24 hr tablet       Sig: Take 1 tablet by mouth every night at bedtime.   • budesonide-formoterol (SYMBICORT) 160-4.5 MCG/ACT inhaler       Sig: Daily As Needed. States he is only using as needed.        Pharmacy where request should be sent: [facility] [address] - [phone]  - [phone] FX     Additional details provided by patient: DUE TO INSURANCE, PATIENT ASKS THAT ALL MEDICATION BE PRESCRIBED IN 90 DAY QUANTITY     Does the patient have less than a 3 day supply:  [] Yes  [x] No    Abi Springer Rep   10/10/22 10:54 EDT

## 2022-12-21 ENCOUNTER — TELEPHONE (OUTPATIENT)
Dept: FAMILY MEDICINE CLINIC | Facility: CLINIC | Age: 57
End: 2022-12-21

## 2022-12-21 NOTE — TELEPHONE ENCOUNTER
Hub please inform patient that I called the number listed and it kept asking for a passcode? Was not able to leave vm but will call pt to schedule appointment about this

## 2022-12-21 NOTE — TELEPHONE ENCOUNTER
Caller: GAY [employer]    Relationship to patient: Other    Best call back number: [phone]    Patient is needing: [employer] IS FOLLOWING UP ON A FAX SENT ON 12/7/22 REGARDING THEIR RECOMMENDATION FOR THE PATIENT TO BE PRESCRIBED A STATIN BASED ON DIAGNOSIS OF DIABETES.       FAX: [phone]

## 2022-12-28 ENCOUNTER — OFFICE VISIT (OUTPATIENT)
Dept: FAMILY MEDICINE CLINIC | Facility: CLINIC | Age: 57
End: 2022-12-28

## 2022-12-28 VITALS
WEIGHT: 228 LBS | HEART RATE: 94 BPM | BODY MASS INDEX: 34.56 KG/M2 | SYSTOLIC BLOOD PRESSURE: 122 MMHG | HEIGHT: 68 IN | DIASTOLIC BLOOD PRESSURE: 80 MMHG | OXYGEN SATURATION: 94 %

## 2022-12-28 DIAGNOSIS — E03.9 ACQUIRED HYPOTHYROIDISM: ICD-10-CM

## 2022-12-28 DIAGNOSIS — Z12.5 SCREENING PSA (PROSTATE SPECIFIC ANTIGEN): ICD-10-CM

## 2022-12-28 DIAGNOSIS — Z11.59 ENCOUNTER FOR HEPATITIS C SCREENING TEST FOR LOW RISK PATIENT: ICD-10-CM

## 2022-12-28 DIAGNOSIS — E11.65 TYPE 2 DIABETES MELLITUS WITH HYPERGLYCEMIA, WITHOUT LONG-TERM CURRENT USE OF INSULIN: ICD-10-CM

## 2022-12-28 DIAGNOSIS — Z00.00 ANNUAL PHYSICAL EXAM: Primary | ICD-10-CM

## 2022-12-28 PROCEDURE — 99396 PREV VISIT EST AGE 40-64: CPT | Performed by: INTERNAL MEDICINE

## 2022-12-28 NOTE — PROGRESS NOTES
Subjective Chief complaint is annual exam  Reddy Stewart is a 57 y.o. male.     History of Present Illness Yosi is here today for his annual exam.  He recently got up for respiratory illness.  He was treated with some steroids and apparently some antibiotics and is feeling better.  He does have non-insulin-dependent diabetes.  It has been approximately a year since we have checked his A1c.  He is on some Lipitor for his cholesterol.  Since his last visit he has lost approximately 20 pounds.  We did go over his health maintenance issues.  He does not want to receive the shingles vaccine or COVID vaccines.  We did  regarding pneumococcal vaccine and flu vaccine.  He is going to receive the flu vaccine here today.       The following portions of the patient's history were reviewed and updated as appropriate: allergies, current medications, past family history, past medical history, past social history, past surgical history and problem list.    Review of Systems   Constitutional: Negative for chills, fatigue and fever.   HENT: Negative for congestion, ear pain, hearing loss, nosebleeds, sore throat, trouble swallowing and voice change.         He is complaining of some scratchiness in his throat   Eyes: Negative for blurred vision, double vision and visual disturbance.   Respiratory: Positive for cough. Negative for chest tightness.         He still reports a cough.  He does sometimes cough to the point of passing out.   Cardiovascular: Negative for chest pain, palpitations and leg swelling.   Gastrointestinal: Negative for abdominal pain, blood in stool, constipation, diarrhea, GERD and indigestion.   Genitourinary: Negative for dysuria, hematuria and nocturia.   Musculoskeletal: Positive for back pain.   Neurological: Negative for dizziness, weakness, light-headedness, numbness and headache.        He occasionally reports that things would drop out of his right hand.       Objective   Physical Exam  Vitals and  nursing note reviewed.   HENT:      Right Ear: Tympanic membrane and ear canal normal.      Left Ear: Tympanic membrane and ear canal normal.      Nose: Congestion present.      Mouth/Throat:      Mouth: Mucous membranes are moist.      Pharynx: Oropharynx is clear. No oropharyngeal exudate or posterior oropharyngeal erythema.      Comments: There is no oral thrush  Eyes:      General: No scleral icterus.     Extraocular Movements: Extraocular movements intact.      Conjunctiva/sclera: Conjunctivae normal.      Pupils: Pupils are equal, round, and reactive to light.   Neck:      Thyroid: No thyroid mass or thyromegaly.      Vascular: No carotid bruit.   Cardiovascular:      Rate and Rhythm: Normal rate and regular rhythm.      Pulses: Normal pulses.      Heart sounds: No murmur heard.    No friction rub. No gallop.   Pulmonary:      Effort: Pulmonary effort is normal.      Breath sounds: No wheezing, rhonchi or rales.   Abdominal:      General: Bowel sounds are normal.      Palpations: Abdomen is soft.      Tenderness: There is no abdominal tenderness. There is no guarding or rebound.   Musculoskeletal:      Right lower leg: No edema.      Left lower leg: No edema.   Lymphadenopathy:      Cervical: No cervical adenopathy.   Neurological:      General: No focal deficit present.      Mental Status: He is alert.      Cranial Nerves: No cranial nerve deficit.      Motor: No weakness.      Coordination: Coordination normal.           Assessment & Plan   Diagnoses and all orders for this visit:    1. Annual physical exam (Primary)  -     CBC & Differential  -     Comprehensive Metabolic Panel  -     Hemoglobin A1c  -     Lipid Panel  -     Microalbumin / Creatinine Urine Ratio - Urine, Clean Catch  -     TSH+Free T4  -     Hepatitis C Antibody    2. Encounter for hepatitis C screening test for low risk patient  -     Hepatitis C Antibody    3. Type 2 diabetes mellitus with hyperglycemia, without long-term current use of  insulin (HCC)  -     Microalbumin / Creatinine Urine Ratio - Urine, Clean Catch    4. Acquired hypothyroidism  -     TSH+Free T4    5. Screening PSA (prostate specific antigen)  -     PSA Screen    Yosi is here today for his annual physical exam.  He is reluctant to get the COVID vaccines or shingles vaccine.  We did  regarding some other vaccines as above.  We are going to do some appropriate lab work here today.

## 2023-01-03 LAB
ALBUMIN SERPL-MCNC: 5 G/DL (ref 3.5–5.2)
ALBUMIN/CREAT UR: 46 MG/G CREAT (ref 0–29)
ALBUMIN/GLOB SERPL: 2.4 G/DL
ALP SERPL-CCNC: 156 U/L (ref 39–117)
ALT SERPL-CCNC: 30 U/L (ref 1–41)
AST SERPL-CCNC: 22 U/L (ref 1–40)
BASOPHILS # BLD AUTO: NORMAL 10*3/UL
BILIRUB SERPL-MCNC: 0.3 MG/DL (ref 0–1.2)
BUN SERPL-MCNC: 22 MG/DL (ref 6–20)
BUN/CREAT SERPL: 16.9 (ref 7–25)
CALCIUM SERPL-MCNC: 10 MG/DL (ref 8.6–10.5)
CHLORIDE SERPL-SCNC: 101 MMOL/L (ref 98–107)
CHOLEST SERPL-MCNC: NORMAL MG/DL
CO2 SERPL-SCNC: 16.5 MMOL/L (ref 22–29)
CREAT SERPL-MCNC: 1.3 MG/DL (ref 0.76–1.27)
CREAT UR-MCNC: 50.8 MG/DL
EGFRCR SERPLBLD CKD-EPI 2021: 64.5 ML/MIN/1.73
EOSINOPHIL # BLD AUTO: NORMAL 10*3/UL
EOSINOPHIL NFR BLD AUTO: NORMAL %
GLOBULIN SER CALC-MCNC: 2.1 GM/DL
GLUCOSE SERPL-MCNC: 491 MG/DL (ref 65–99)
HBA1C MFR BLD: 11.1 % (ref 4.8–5.6)
HCT VFR BLD AUTO: NORMAL %
HCV AB S/CO SERPL IA: <0.1 S/CO RATIO (ref 0–0.9)
HDLC SERPL-MCNC: NORMAL MG/DL
HGB BLD-MCNC: NORMAL G/DL
LYMPHOCYTES # BLD AUTO: NORMAL 10*3/UL
LYMPHOCYTES NFR BLD AUTO: NORMAL %
MICROALBUMIN UR-MCNC: 23.3 UG/ML
MONOCYTES NFR BLD AUTO: NORMAL %
NEUTROPHILS NFR BLD AUTO: NORMAL %
PLATELET # BLD AUTO: NORMAL 10*3/UL
POTASSIUM SERPL-SCNC: 5 MMOL/L (ref 3.5–5.2)
PROT SERPL-MCNC: 7.1 G/DL (ref 6–8.5)
PSA SERPL-MCNC: 0.22 NG/ML (ref 0–4)
RBC # BLD AUTO: NORMAL 10*6/UL
REQUEST PROBLEM: NORMAL
SODIUM SERPL-SCNC: 139 MMOL/L (ref 136–145)
T4 FREE SERPL-MCNC: 0.91 NG/DL (ref 0.93–1.7)
TRIGL SERPL-MCNC: NORMAL MG/DL
TSH SERPL DL<=0.005 MIU/L-ACNC: 1.33 UIU/ML (ref 0.27–4.2)
WBC # BLD AUTO: NORMAL 10*3/MM3

## 2023-01-06 DIAGNOSIS — E11.65 TYPE 2 DIABETES MELLITUS WITH HYPERGLYCEMIA, WITHOUT LONG-TERM CURRENT USE OF INSULIN: Primary | ICD-10-CM

## 2023-01-09 ENCOUNTER — TELEPHONE (OUTPATIENT)
Dept: FAMILY MEDICINE CLINIC | Facility: CLINIC | Age: 58
End: 2023-01-09
Payer: COMMERCIAL

## 2023-01-09 DIAGNOSIS — E11.65 TYPE 2 DIABETES MELLITUS WITH HYPERGLYCEMIA, WITHOUT LONG-TERM CURRENT USE OF INSULIN: ICD-10-CM

## 2023-01-09 NOTE — TELEPHONE ENCOUNTER
Caller: Reddy Stewart    Relationship: Self    Best call back number: [phone]    What is the best time to reach you: ANYTIME    Who are you requesting to speak with (clinical staff, provider,  specific staff member): CLINICAL    What was the call regarding: PATIENT STATES HE IS WANTING TO KNOW WHAT ELSE HE CAN BE DOING TO GET HIS A1C DOWN OTHER THAN JUST UPPING HIS MEDICATION.     PLEASE CALL AND ADVISE.     I advised that the patient will need to watch his carbohydrate intake.  He is not drinking sugary drinks.  I also advised him to contact his psychiatrist regarding the Seroquel.

## 2023-01-10 ENCOUNTER — TELEPHONE (OUTPATIENT)
Dept: FAMILY MEDICINE CLINIC | Facility: CLINIC | Age: 58
End: 2023-01-10

## 2023-01-10 NOTE — TELEPHONE ENCOUNTER
Caller: Reddy Stewart    Relationship: Self    Best call back number:[phone]    What orders are you requesting (i.e. lab or imaging): LABS     In what timeframe would the patient need to come in: ASAP    Where will you receive your lab/imaging services: OFFICE    Additional notes: PATIENT IS WANTING TO KNOW IF HE CAN DO REPEAT LABS TO SEE IF HIS LEVELS HAVE COME DOWN. HE WOULD LIKE TO COME IN Friday BUT NEEDS TO GIVE HIS RIDE 48 HOUR NOTICE. PLEASE CALL AND ADVISE.

## 2023-01-23 RX ORDER — ATORVASTATIN CALCIUM 80 MG/1
TABLET, FILM COATED ORAL
Qty: 90 TABLET | Refills: 0 | Status: SHIPPED | OUTPATIENT
Start: 2023-01-23 | End: 2023-01-27 | Stop reason: SDUPTHER

## 2023-01-23 RX ORDER — DICLOFENAC SODIUM 75 MG/1
TABLET, DELAYED RELEASE ORAL
Qty: 180 TABLET | Refills: 0 | Status: SHIPPED | OUTPATIENT
Start: 2023-01-23 | End: 2023-01-27 | Stop reason: SDUPTHER

## 2023-01-27 DIAGNOSIS — E11.65 TYPE 2 DIABETES MELLITUS WITH HYPERGLYCEMIA, WITHOUT LONG-TERM CURRENT USE OF INSULIN: ICD-10-CM

## 2023-01-27 RX ORDER — DICLOFENAC SODIUM 75 MG/1
75 TABLET, DELAYED RELEASE ORAL 2 TIMES DAILY
Qty: 180 TABLET | Refills: 0 | OUTPATIENT
Start: 2023-01-27

## 2023-01-27 RX ORDER — DICLOFENAC SODIUM 75 MG/1
75 TABLET, DELAYED RELEASE ORAL 2 TIMES DAILY
Qty: 180 TABLET | Refills: 0 | Status: SHIPPED | OUTPATIENT
Start: 2023-01-27

## 2023-01-27 RX ORDER — LEVOTHYROXINE SODIUM 0.05 MG/1
50 TABLET ORAL DAILY
Qty: 90 TABLET | Refills: 1 | OUTPATIENT
Start: 2023-01-27

## 2023-01-27 RX ORDER — VALSARTAN 160 MG/1
160 TABLET ORAL DAILY
Qty: 90 TABLET | Refills: 1 | OUTPATIENT
Start: 2023-01-27

## 2023-01-27 RX ORDER — HYDROXYZINE HYDROCHLORIDE 25 MG/1
25 TABLET, FILM COATED ORAL DAILY
OUTPATIENT
Start: 2023-01-27

## 2023-01-27 RX ORDER — LITHIUM CARBONATE 300 MG/1
300 CAPSULE ORAL DAILY
OUTPATIENT
Start: 2023-01-27

## 2023-01-27 RX ORDER — TIZANIDINE 4 MG/1
4 TABLET ORAL NIGHTLY PRN
Qty: 90 TABLET | Refills: 1 | OUTPATIENT
Start: 2023-01-27

## 2023-01-27 RX ORDER — QUETIAPINE FUMARATE 300 MG/1
300 TABLET, FILM COATED ORAL
OUTPATIENT
Start: 2023-01-27

## 2023-01-27 RX ORDER — DIVALPROEX SODIUM 500 MG/1
500 TABLET, EXTENDED RELEASE ORAL
OUTPATIENT
Start: 2023-01-27

## 2023-01-27 RX ORDER — ATORVASTATIN CALCIUM 80 MG/1
80 TABLET, FILM COATED ORAL DAILY
Qty: 90 TABLET | Refills: 0 | OUTPATIENT
Start: 2023-01-27

## 2023-01-27 RX ORDER — VALSARTAN 160 MG/1
160 TABLET ORAL DAILY
Qty: 90 TABLET | Refills: 1 | Status: SHIPPED | OUTPATIENT
Start: 2023-01-27

## 2023-01-27 RX ORDER — BUDESONIDE AND FORMOTEROL FUMARATE DIHYDRATE 160; 4.5 UG/1; UG/1
2 AEROSOL RESPIRATORY (INHALATION) DAILY
Qty: 30 G | Refills: 1 | Status: SHIPPED | OUTPATIENT
Start: 2023-01-27

## 2023-01-27 RX ORDER — BUDESONIDE AND FORMOTEROL FUMARATE DIHYDRATE 160; 4.5 UG/1; UG/1
2 AEROSOL RESPIRATORY (INHALATION) DAILY
Qty: 30 G | Refills: 1 | OUTPATIENT
Start: 2023-01-27

## 2023-01-27 RX ORDER — LEVOTHYROXINE SODIUM 0.05 MG/1
50 TABLET ORAL DAILY
Qty: 90 TABLET | Refills: 1 | Status: SHIPPED | OUTPATIENT
Start: 2023-01-27

## 2023-01-27 RX ORDER — TRAZODONE HYDROCHLORIDE 150 MG/1
150 TABLET ORAL NIGHTLY
OUTPATIENT
Start: 2023-01-27

## 2023-01-27 RX ORDER — TIZANIDINE 4 MG/1
4 TABLET ORAL NIGHTLY PRN
Qty: 90 TABLET | Refills: 1 | Status: SHIPPED | OUTPATIENT
Start: 2023-01-27

## 2023-01-27 RX ORDER — ATORVASTATIN CALCIUM 80 MG/1
80 TABLET, FILM COATED ORAL DAILY
Qty: 90 TABLET | Refills: 0 | Status: SHIPPED | OUTPATIENT
Start: 2023-01-27

## 2023-01-27 NOTE — TELEPHONE ENCOUNTER
Caller: Reddy Stewart    Relationship: Self    Best call back number: [phone] (Mobile)    Requested Prescriptions:   Requested Prescriptions     Pending Prescriptions Disp Refills   • atorvastatin (LIPITOR) 80 MG tablet 90 tablet 0     Sig: Take 1 tablet by mouth Daily.   • budesonide-formoterol (SYMBICORT) 160-4.5 MCG/ACT inhaler 30 g 1     Sig: Inhale 2 puffs Daily. States he is only using as needed.   • diclofenac (VOLTAREN) 75 MG EC tablet 180 tablet 0     Sig: Take 1 tablet by mouth 2 (Two) Times a Day.   • hydrOXYzine (ATARAX) 25 MG tablet       Sig: Take 1 tablet by mouth Daily.   • divalproex (DEPAKOTE ER) 500 MG 24 hr tablet       Sig: Take 1 tablet by mouth every night at bedtime.   • levothyroxine (SYNTHROID, LEVOTHROID) 50 MCG tablet 90 tablet 1     Sig: Take 1 tablet by mouth Daily.   • metFORMIN (Glucophage) 500 MG tablet 360 tablet 1     Sig: Take 2 tablets by mouth 2 (Two) Times a Day With Meals.   • lithium carbonate 300 MG capsule       Sig: Take 1 capsule by mouth Daily.   • QUEtiapine (SEROquel) 300 MG tablet       Sig: Take 1 tablet by mouth every night at bedtime.   • tiZANidine (ZANAFLEX) 4 MG tablet 90 tablet 1     Sig: Take 1 tablet by mouth At Night As Needed for Muscle Spasms.   • traZODone (DESYREL) 150 MG tablet       Sig: Take 1 tablet by mouth Every Night.   • valsartan (DIOVAN) 160 MG tablet 90 tablet 1     Sig: Take 1 tablet by mouth Daily.        Pharmacy where request should be sent: [id number] - [place] [id number] [facility] [address] [facility] [phone] [facility] [phone] FX     Additional details provided by patient: PLEASE SEND ALL CURRENT MEDICATIONS TO [facility].     Does the patient have less than a 3 day supply:  [x] Yes  [] No    Would you like a call back once the refill request has been completed: [x] Yes [] No    If the office needs to give you a call back, can they leave a voicemail: [x] Yes [] No    Abi Arreguin  Rep   01/27/23 10:38 EST

## 2023-03-02 ENCOUNTER — TELEPHONE (OUTPATIENT)
Dept: FAMILY MEDICINE CLINIC | Facility: CLINIC | Age: 58
End: 2023-03-02
Payer: COMMERCIAL

## 2023-03-02 RX ORDER — BLOOD-GLUCOSE METER
1 KIT MISCELLANEOUS DAILY
Qty: 1 EACH | Refills: 0 | Status: SHIPPED | OUTPATIENT
Start: 2023-03-02

## 2023-03-02 RX ORDER — LANCETS 30 GAUGE
1 EACH MISCELLANEOUS DAILY
Qty: 100 EACH | Refills: 3 | Status: SHIPPED | OUTPATIENT
Start: 2023-03-02

## 2023-03-02 NOTE — TELEPHONE ENCOUNTER
His sugar at 7 a.m. today was 213 at Dr. Rasmussen's office.  He had been fasting since midnight.      Please advise    I advised that his last A1c would indicate that his sugars probably average over 300.  We did discuss that he needs to watch his diet and also possibly come off the Seroquel prescribed by his psychiatrist.  I recommended insulin is probably the best way to get this under control but he does not want to do that.  We are going to try adding Sitagliptin but I did advise that we will likely have to add more oral medication which will get quite expensive.

## 2023-03-03 ENCOUNTER — TELEPHONE (OUTPATIENT)
Dept: FAMILY MEDICINE CLINIC | Facility: CLINIC | Age: 58
End: 2023-03-03
Payer: COMMERCIAL

## 2023-03-03 NOTE — TELEPHONE ENCOUNTER
Caller: Reddy Stewart    Relationship to patient: Self    Best call back number: [phone]    Patient is needing: PATIENT ADVISED THAT [facility] SAID THEY ARE WAITING ON THE OFFICE TO APPROVE THE GLUCOMETER.  HE NEEDS TO GET THIS TODAY.  CAN YOU PLEASE TAKE CARE OF IT.     I advised the patient that I have phoned the pharmacy.  Apparently he needs a face-to-face visit.  They are not sure if his December 28 visit will count.  I will see if I can make that happen but will not happen this weekend.

## 2023-03-10 ENCOUNTER — TELEPHONE (OUTPATIENT)
Dept: FAMILY MEDICINE CLINIC | Facility: CLINIC | Age: 58
End: 2023-03-10
Payer: COMMERCIAL

## 2023-03-10 NOTE — TELEPHONE ENCOUNTER
"  Caller: Reddy Stewart \"JEANIE\"    Relationship: Self    Best call back number: [phone]    What was the call regarding: PATIENT READ AN ARTICLE THAT STATED METFORMIN IS NOT GOOD TO TAKE AND CAN CAUSE LIVER DAMAGE BUT THAT IF YOU HAVE DIABETES AND DON'T TAKE IT, THEN THE DIABETES MIGHT BE OUT OF CONTROL.     PLEASE ADVISE WHAT DR RHOADES.     Do you require a callback: YES    Advised to continue for now      "

## 2023-04-12 ENCOUNTER — HOSPITAL ENCOUNTER (EMERGENCY)
Facility: HOSPITAL | Age: 58
Discharge: HOME | End: 2023-04-12
Attending: EMERGENCY MEDICINE | Admitting: EMERGENCY MEDICINE
Payer: MEDICARE

## 2023-04-12 VITALS
BODY MASS INDEX: 34.56 KG/M2 | SYSTOLIC BLOOD PRESSURE: 140 MMHG | HEART RATE: 79 BPM | RESPIRATION RATE: 16 BRPM | OXYGEN SATURATION: 96 % | WEIGHT: 228 LBS | HEIGHT: 68 IN | TEMPERATURE: 97.5 F | DIASTOLIC BLOOD PRESSURE: 95 MMHG

## 2023-04-12 DIAGNOSIS — G51.0 LEFT-SIDED BELL'S PALSY: Primary | ICD-10-CM

## 2023-04-12 LAB
ALBUMIN SERPL-MCNC: 5 G/DL (ref 3.5–5.2)
ALBUMIN/GLOB SERPL: 2.4 G/DL
ALP SERPL-CCNC: 63 U/L (ref 39–117)
ALT SERPL W P-5'-P-CCNC: 37 U/L (ref 1–41)
ANION GAP SERPL CALCULATED.3IONS-SCNC: 13.1 MMOL/L (ref 5–15)
AST SERPL-CCNC: 16 U/L (ref 1–40)
BASOPHILS # BLD AUTO: 0.03 10*3/MM3 (ref 0–0.2)
BASOPHILS NFR BLD AUTO: 0.4 % (ref 0–1.5)
BILIRUB SERPL-MCNC: 0.4 MG/DL (ref 0–1.2)
BUN SERPL-MCNC: 19 MG/DL (ref 6–20)
BUN/CREAT SERPL: 13.4 (ref 7–25)
CALCIUM SPEC-SCNC: 10.2 MG/DL (ref 8.6–10.5)
CHLORIDE SERPL-SCNC: 100 MMOL/L (ref 98–107)
CHOLEST SERPL-MCNC: 133 MG/DL (ref 0–200)
CO2 SERPL-SCNC: 24.9 MMOL/L (ref 22–29)
CREAT SERPL-MCNC: 1.42 MG/DL (ref 0.76–1.27)
DEPRECATED RDW RBC AUTO: 42.3 FL (ref 37–54)
EGFRCR SERPLBLD CKD-EPI 2021: 58 ML/MIN/1.73
EOSINOPHIL # BLD AUTO: 0.16 10*3/MM3 (ref 0–0.4)
EOSINOPHIL NFR BLD AUTO: 2 % (ref 0.3–6.2)
ERYTHROCYTE [DISTWIDTH] IN BLOOD BY AUTOMATED COUNT: 13.1 % (ref 12.3–15.4)
GLOBULIN UR ELPH-MCNC: 2.1 GM/DL
GLUCOSE BLDC GLUCOMTR-MCNC: 116 MG/DL (ref 70–130)
GLUCOSE SERPL-MCNC: 109 MG/DL (ref 65–99)
HBA1C MFR BLD: 7.1 % (ref 4.8–5.6)
HCT VFR BLD AUTO: 43.5 % (ref 37.5–51)
HDLC SERPL-MCNC: 31 MG/DL (ref 40–60)
HGB BLD-MCNC: 15.3 G/DL (ref 13–17.7)
IMM GRANULOCYTES # BLD AUTO: 0.04 10*3/MM3 (ref 0–0.05)
IMM GRANULOCYTES NFR BLD AUTO: 0.5 % (ref 0–0.5)
LDLC SERPL CALC-MCNC: 36 MG/DL (ref 0–100)
LDLC/HDLC SERPL: 0.3 {RATIO}
LITHIUM SERPL-SCNC: 0.6 MMOL/L (ref 0.6–1.2)
LYMPHOCYTES # BLD AUTO: 2.12 10*3/MM3 (ref 0.7–3.1)
LYMPHOCYTES NFR BLD AUTO: 27 % (ref 19.6–45.3)
MCH RBC QN AUTO: 31.1 PG (ref 26.6–33)
MCHC RBC AUTO-ENTMCNC: 35.2 G/DL (ref 31.5–35.7)
MCV RBC AUTO: 88.4 FL (ref 79–97)
MONOCYTES # BLD AUTO: 0.65 10*3/MM3 (ref 0.1–0.9)
MONOCYTES NFR BLD AUTO: 8.3 % (ref 5–12)
NEUTROPHILS NFR BLD AUTO: 4.84 10*3/MM3 (ref 1.7–7)
NEUTROPHILS NFR BLD AUTO: 61.8 % (ref 42.7–76)
NRBC BLD AUTO-RTO: 0 /100 WBC (ref 0–0.2)
PLATELET # BLD AUTO: 235 10*3/MM3 (ref 140–450)
PMV BLD AUTO: 10 FL (ref 6–12)
POTASSIUM SERPL-SCNC: 4.3 MMOL/L (ref 3.5–5.2)
PROT SERPL-MCNC: 7.1 G/DL (ref 6–8.5)
RBC # BLD AUTO: 4.92 10*6/MM3 (ref 4.14–5.8)
SODIUM SERPL-SCNC: 138 MMOL/L (ref 136–145)
TRIGL SERPL-MCNC: 464 MG/DL (ref 0–150)
VALPROATE SERPL-MCNC: 25 MCG/ML (ref 50–125)
VLDLC SERPL-MCNC: 66 MG/DL (ref 5–40)
WBC NRBC COR # BLD: 7.84 10*3/MM3 (ref 3.4–10.8)

## 2023-04-12 PROCEDURE — 63710000001 PREDNISONE PER 1 MG: Performed by: EMERGENCY MEDICINE

## 2023-04-12 PROCEDURE — 87798 DETECT AGENT NOS DNA AMP: CPT | Performed by: EMERGENCY MEDICINE

## 2023-04-12 PROCEDURE — 82962 GLUCOSE BLOOD TEST: CPT

## 2023-04-12 PROCEDURE — 80178 ASSAY OF LITHIUM: CPT | Performed by: EMERGENCY MEDICINE

## 2023-04-12 PROCEDURE — 85025 COMPLETE CBC W/AUTO DIFF WBC: CPT | Performed by: EMERGENCY MEDICINE

## 2023-04-12 PROCEDURE — 99284 EMERGENCY DEPT VISIT MOD MDM: CPT

## 2023-04-12 PROCEDURE — 83036 HEMOGLOBIN GLYCOSYLATED A1C: CPT | Performed by: EMERGENCY MEDICINE

## 2023-04-12 PROCEDURE — 36415 COLL VENOUS BLD VENIPUNCTURE: CPT

## 2023-04-12 PROCEDURE — 80061 LIPID PANEL: CPT | Performed by: EMERGENCY MEDICINE

## 2023-04-12 PROCEDURE — 80053 COMPREHEN METABOLIC PANEL: CPT | Performed by: EMERGENCY MEDICINE

## 2023-04-12 PROCEDURE — 80164 ASSAY DIPROPYLACETIC ACD TOT: CPT | Performed by: EMERGENCY MEDICINE

## 2023-04-12 PROCEDURE — 86618 LYME DISEASE ANTIBODY: CPT | Performed by: EMERGENCY MEDICINE

## 2023-04-12 PROCEDURE — 87484 EHRLICHA CHAFFEENSIS AMP PRB: CPT | Performed by: EMERGENCY MEDICINE

## 2023-04-12 RX ORDER — HYDROCODONE BITARTRATE AND ACETAMINOPHEN 7.5; 325 MG/1; MG/1
1 TABLET ORAL EVERY 6 HOURS PRN
COMMUNITY

## 2023-04-12 RX ORDER — MINERAL OIL, PETROLATUM 425; 568 MG/G; MG/G
OINTMENT OPHTHALMIC NIGHTLY
Qty: 3.5 G | Refills: 0 | Status: SHIPPED | OUTPATIENT
Start: 2023-04-12

## 2023-04-12 RX ORDER — PREDNISONE 20 MG/1
60 TABLET ORAL ONCE
Status: COMPLETED | OUTPATIENT
Start: 2023-04-12 | End: 2023-04-12

## 2023-04-12 RX ORDER — PREDNISONE 20 MG/1
60 TABLET ORAL DAILY
Qty: 21 TABLET | Refills: 0 | Status: SHIPPED | OUTPATIENT
Start: 2023-04-12 | End: 2023-04-19

## 2023-04-12 RX ORDER — VALACYCLOVIR HYDROCHLORIDE 1 G/1
1000 TABLET, FILM COATED ORAL 3 TIMES DAILY
Qty: 21 TABLET | Refills: 0 | Status: SHIPPED | OUTPATIENT
Start: 2023-04-12 | End: 2023-04-19

## 2023-04-12 RX ADMIN — PREDNISONE 60 MG: 20 TABLET ORAL at 23:06

## 2023-04-13 ENCOUNTER — OFFICE VISIT (OUTPATIENT)
Dept: FAMILY MEDICINE CLINIC | Facility: CLINIC | Age: 58
End: 2023-04-13
Payer: MEDICARE

## 2023-04-13 VITALS
WEIGHT: 232 LBS | OXYGEN SATURATION: 96 % | BODY MASS INDEX: 35.16 KG/M2 | SYSTOLIC BLOOD PRESSURE: 132 MMHG | HEIGHT: 68 IN | HEART RATE: 91 BPM | DIASTOLIC BLOOD PRESSURE: 84 MMHG

## 2023-04-13 DIAGNOSIS — G51.0 BELL'S PALSY: ICD-10-CM

## 2023-04-13 DIAGNOSIS — E11.65 TYPE 2 DIABETES MELLITUS WITH HYPERGLYCEMIA, WITHOUT LONG-TERM CURRENT USE OF INSULIN: Primary | ICD-10-CM

## 2023-04-13 PROCEDURE — 3075F SYST BP GE 130 - 139MM HG: CPT | Performed by: INTERNAL MEDICINE

## 2023-04-13 PROCEDURE — 3051F HG A1C>EQUAL 7.0%<8.0%: CPT | Performed by: INTERNAL MEDICINE

## 2023-04-13 PROCEDURE — 3079F DIAST BP 80-89 MM HG: CPT | Performed by: INTERNAL MEDICINE

## 2023-04-13 PROCEDURE — 99213 OFFICE O/P EST LOW 20 MIN: CPT | Performed by: INTERNAL MEDICINE

## 2023-04-13 RX ORDER — CYCLOBENZAPRINE HCL 10 MG
TABLET ORAL
COMMUNITY
Start: 2023-04-05

## 2023-04-13 NOTE — ED PROVIDER NOTES
EMERGENCY DEPARTMENT ENCOUNTER    Room Number:  39/39  Date seen:  4/12/2023  PCP: Casper Shannon MD      HPI:  Chief Complaint: Strokelike symptoms  A complete HPI/ROS/PMH/PSH/SH/FH are unobtainable due to: None  Context: Reddy Stewart is a 57 y.o. male who presents to the ED c/o strokelike symptoms.  He complains of having left-sided facial droop.  Symptoms began gradually yesterday.  He cannot smile symmetrically with his left side of his face drooping.  He is having difficulty swallowing liquid as dribbles out of his mouth.  He cannot close his left eye completely.  He is also noticed some abnormal sensation to the dorsum of his left wrist.  He is recently diagnosed as a diabetic.  He has recently developed cold sores to his lips.  He recently noticed that a tick was on him.          PAST MEDICAL HISTORY  Active Ambulatory Problems     Diagnosis Date Noted   • Attention deficit hyperactivity disorder (ADHD) 08/26/2020   • Bipolar disorder 08/26/2020   • Chronic obstructive pulmonary disease 08/26/2020   • Acquired hypothyroidism 08/26/2020   • Essential (primary) hypertension 08/26/2020   • Cough syncope 09/10/2020   • Anxiety attack 09/23/2020   • Bipolar 1 disorder, mixed 09/23/2020   • Body mass index (bmi) 31.0-31.9, adult 02/18/2019   • COPD with chronic bronchitis 09/23/2020   • Depression with anxiety 09/23/2020   • Seasonal allergies 09/23/2020   • Snoring 09/23/2020   • Spells of decreased attentiveness 12/09/2020   • Chronic cough 10/11/2021   • Encounter for screening for malignant neoplasm of colon 10/11/2021   • Type 2 diabetes mellitus with hyperglycemia, without long-term current use of insulin 12/28/2022     Resolved Ambulatory Problems     Diagnosis Date Noted   • No Resolved Ambulatory Problems     Past Medical History:   Diagnosis Date   • Anxiety    • Asthma    • Cluster headache    • COPD (chronic obstructive pulmonary disease)    • Diabetes mellitus    • Elevated cholesterol    •  Headache, tension-type    • Hernia    • Hypertension    • Injury of back    • Migraine    • Seizures    • Sleep apnea    • Syncope          PAST SURGICAL HISTORY  Past Surgical History:   Procedure Laterality Date   • COLONOSCOPY W/ POLYPECTOMY N/A 11/3/2021    Procedure: COLONOSCOPY;  Surgeon: Anthony Dotson MD;  Location: [facility] ENDOSCOPY;  Service: Gastroenterology;  Laterality: N/A;  pre-screen  post-normal   • ENDOSCOPY N/A 11/3/2021    Procedure: ESOPHAGOGASTRODUODENOSCOPY with biopsies;  Surgeon: Anthony Dotson MD;  Location: [facility] ENDOSCOPY;  Service: Gastroenterology;  Laterality: N/A;  pre-chronic cough  post-gastritis, esophagitis   • HAND SURGERY     • HERNIA REPAIR      At 10 years old   • KNEE SURGERY     • SHOULDER SURGERY           FAMILY HISTORY  Family History   Problem Relation Age of Onset   • Cancer Mother    • Heart disease Father    • Diabetes Sister    • Malig Hyperthermia Neg Hx          SOCIAL HISTORY  Social History     Socioeconomic History   • Marital status:    Tobacco Use   • Smoking status: Never   • Smokeless tobacco: Never   Substance and Sexual Activity   • Alcohol use: Yes     Alcohol/week: 2.0 standard drinks     Types: 1 Cans of beer, 1 Standard drinks or equivalent per week     Comment: 1-2/wk   • Drug use: Never   • Sexual activity: Yes     Partners: Female     Birth control/protection: None         ALLERGIES  Penicillins        REVIEW OF SYSTEMS  Review of Systems     All systems reviewed and negative except for those discussed in HPI.       PHYSICAL EXAM  ED Triage Vitals   Temp Heart Rate Resp BP SpO2   04/12/23 2035 04/12/23 2035 04/12/23 2035 04/12/23 2040 04/12/23 2035   97.5 °F (36.4 °C) 99 16 144/93 97 %      Temp src Heart Rate Source Patient Position BP Location FiO2 (%)   04/12/23 2035 04/12/23 2035 -- -- --   Tympanic Monitor          Physical Exam      GENERAL: no acute distress  HENT: nares patent, TMs clear bilaterally, cold sore to the  lower lip  EYES: no scleral icterus  CV: regular rhythm, normal rate  RESPIRATORY: normal effort, clear to auscultation bilaterally  ABDOMEN: soft, nontender  MUSCULOSKELETAL: no deformity  NEURO: Left-sided facial droop, asymmetric eyebrow elevation, he cannot fully close his left eyelid, 5/5 strength to the arms with no pronator drift, he has 4/5 strength of bilateral legs which she attributes to his chronic back pain, he has overall normal sensation to his arms except for a patch of abnormal sensation to the dorsum of the left wrist  PSYCH:  calm, cooperative  SKIN: warm, dry    Vital signs and nursing notes reviewed.          LAB RESULTS  Recent Results (from the past 24 hour(s))   POC Glucose Once    Collection Time: 04/12/23  8:40 PM    Specimen: Blood   Result Value Ref Range    Glucose 116 70 - 130 mg/dL   Comprehensive Metabolic Panel    Collection Time: 04/12/23  9:35 PM    Specimen: Blood   Result Value Ref Range    Glucose 109 (H) 65 - 99 mg/dL    BUN 19 6 - 20 mg/dL    Creatinine 1.42 (H) 0.76 - 1.27 mg/dL    Sodium 138 136 - 145 mmol/L    Potassium 4.3 3.5 - 5.2 mmol/L    Chloride 100 98 - 107 mmol/L    CO2 24.9 22.0 - 29.0 mmol/L    Calcium 10.2 8.6 - 10.5 mg/dL    Total Protein 7.1 6.0 - 8.5 g/dL    Albumin 5.0 3.5 - 5.2 g/dL    ALT (SGPT) 37 1 - 41 U/L    AST (SGOT) 16 1 - 40 U/L    Alkaline Phosphatase 63 39 - 117 U/L    Total Bilirubin 0.4 0.0 - 1.2 mg/dL    Globulin 2.1 gm/dL    A/G Ratio 2.4 g/dL    BUN/Creatinine Ratio 13.4 7.0 - 25.0    Anion Gap 13.1 5.0 - 15.0 mmol/L    eGFR 58.0 (L) >60.0 mL/min/1.73   Lithium Level    Collection Time: 04/12/23  9:35 PM    Specimen: Blood   Result Value Ref Range    Lithium 0.6 0.6 - 1.2 mmol/L   CBC Auto Differential    Collection Time: 04/12/23  9:35 PM    Specimen: Blood   Result Value Ref Range    WBC 7.84 3.40 - 10.80 10*3/mm3    RBC 4.92 4.14 - 5.80 10*6/mm3    Hemoglobin 15.3 13.0 - 17.7 g/dL    Hematocrit 43.5 37.5 - 51.0 %    MCV 88.4 79.0 - 97.0  fL    MCH 31.1 26.6 - 33.0 pg    MCHC 35.2 31.5 - 35.7 g/dL    RDW 13.1 12.3 - 15.4 %    RDW-SD 42.3 37.0 - 54.0 fl    MPV 10.0 6.0 - 12.0 fL    Platelets 235 140 - 450 10*3/mm3    Neutrophil % 61.8 42.7 - 76.0 %    Lymphocyte % 27.0 19.6 - 45.3 %    Monocyte % 8.3 5.0 - 12.0 %    Eosinophil % 2.0 0.3 - 6.2 %    Basophil % 0.4 0.0 - 1.5 %    Immature Grans % 0.5 0.0 - 0.5 %    Neutrophils, Absolute 4.84 1.70 - 7.00 10*3/mm3    Lymphocytes, Absolute 2.12 0.70 - 3.10 10*3/mm3    Monocytes, Absolute 0.65 0.10 - 0.90 10*3/mm3    Eosinophils, Absolute 0.16 0.00 - 0.40 10*3/mm3    Basophils, Absolute 0.03 0.00 - 0.20 10*3/mm3    Immature Grans, Absolute 0.04 0.00 - 0.05 10*3/mm3    nRBC 0.0 0.0 - 0.2 /100 WBC   Valproic Acid Level, Total    Collection Time: 04/12/23  9:35 PM    Specimen: Blood   Result Value Ref Range    Valproic Acid 25.0 (L) 50.0 - 125.0 mcg/mL   Lipid Panel    Collection Time: 04/12/23  9:35 PM    Specimen: Blood   Result Value Ref Range    Total Cholesterol 133 0 - 200 mg/dL    Triglycerides 464 (H) 0 - 150 mg/dL    HDL Cholesterol 31 (L) 40 - 60 mg/dL    LDL Cholesterol  36 0 - 100 mg/dL    VLDL Cholesterol 66 (H) 5 - 40 mg/dL    LDL/HDL Ratio 0.30    Hemoglobin A1c    Collection Time: 04/12/23  9:35 PM    Specimen: Blood   Result Value Ref Range    Hemoglobin A1C 7.10 (H) 4.80 - 5.60 %       Ordered the above labs and reviewed the results.        RADIOLOGY  No Radiology Exams Resulted Within Past 24 Hours    Ordered the above noted radiological studies. Reviewed by me in PACS.          PROCEDURES  Procedures          MEDICATIONS GIVEN IN ER  Medications   predniSONE (DELTASONE) tablet 60 mg (60 mg Oral Given 4/12/23 2306)         MEDICAL DECISION MAKING, PROGRESS, and CONSULTS    Discussion below represents my analysis of pertinent findings related to patient's condition, differential diagnosis, treatment plan and final disposition.      Orders placed during this visit:  Orders Placed This Encounter    Procedures   • Comprehensive Metabolic Panel   • Lithium Level   • CBC Auto Differential   • Valproic Acid Level, Total   • Lipid Panel   • Hemoglobin A1c   • Ehrlichia Profile DNA PCR   • Rickettsia Species DNA, Real-Time PCR   • Lyme Disease Total Antibody With Reflex to Immunoassay   • POC Glucose Once   • CBC & Differential         Additional sources:  - External (non-ED) record review: On medical chart review, patient saw Dr. Purvis, his internist on 12/28/2022.  Diagnosed with type 2 diabetes.  He has acquired hypothyroidism.  Patient is reluctant to get COVID vaccines or shingles vaccine.                Differential diagnosis:    Stroke, Bell's palsy, tickborne illness, otitis media      Additional orders considered but not ordered:  I considered obtaining CT imaging to rule out brain bleed as well as CT angiogram to look for cause of stroke.  I considered MRI to definitively evaluate for stroke.  Patient has symptoms that are very consistent with Bell's palsy but the atypical feature is the abnormal sensation to the dorsum of the left wrist.  Therefore, I called neurology and consulted with Dr. Womack.        Independent interpretation of labs, radiology studies, and discussions with consultants:  ED Course as of 04/12/23 2345 Wed Apr 12, 2023   2230 Hemoglobin A1C(!): 7.10 [TD]   2230 Lithium: 0.6 [TD]   2230 Creatinine(!): 1.42 [TD]   2230 Sodium: 138 [TD]   2230 Potassium: 4.3 [TD]   2250 eGFR(!): 58.0 [TD]      ED Course User Index  [TD] Gibran Jovel II, MD         I called Dr. Womack, stroke neurology.  We reviewed patient's history.  Patient's symptoms are rather classic for Bell's palsy given the slowly progressive symptoms over the past 24 hours with associated upper and lower facial symptoms on the left side.  He feels that the abnormal sensation to the dorsum of the left wrist is most consistent with a mononeuropathy related to his diabetes rather than a stroke symptom itself.  He  does not recommend any imaging and instead recommends discharge home with Bell's palsy therapy.  I did let the patient know that if he continues to have abnormal symptoms that progressed to his left hand/wrist that he should follow-up with neurology.  He is in agreement with this plan.    He has an appointment with his PCP on Thursday.      PPE: The patient wore a mask throughout the entire encounter. I wore a well-fitting mask.    DIAGNOSIS  Final diagnoses:   Left-sided Bell's palsy         DISPOSITION  DISCHARGE    FOLLOW-UP  Casper Shannon MD  [address] [id number] [phone]    Schedule an appointment as soon as possible for a visit in 1 week           Medication List      New Prescriptions    artificial tears ointment ophthalmic ointment  Administer  into the left eye Every Night.     predniSONE 20 MG tablet  Commonly known as: DELTASONE  Take 3 tablets by mouth Daily for 7 days.     valACYclovir 1000 MG tablet  Commonly known as: VALTREX  Take 1 tablet by mouth 3 (Three) Times a Day for 7 days.           Where to Get Your Medications      These medications were sent to [facility] [id number] - [address] AT [facility] - [phone]  - [phone] [address], [place] [address]    Phone: [phone]   · artificial tears ointment ophthalmic ointment  · predniSONE 20 MG tablet  · valACYclovir 1000 MG tablet             Latest Documented Vital Signs:  As of 23:45 EDT  BP- 140/95 HR- 79 Temp- 97.5 °F (36.4 °C) (Tympanic) O2 sat- 96%      --    Please note that portions of this were completed with a voice recognition program.       Note Disclaimer: At [facility], we believe that sharing information builds trust and better relationships. You are receiving this note because you are receiving care at [facility] or recently visited. It is possible you will see health information before a provider has talked with you  about it. This kind of information can be easy to misunderstand. To help you fully understand what it means for your health, we urge you to discuss this note with your provider.       Gibran Jovel II, MD  04/13/23 0000

## 2023-04-13 NOTE — PROGRESS NOTES
Answers for HPI/ROS submitted by the patient on 4/6/2023  What is the primary reason for your visit?: Diabetes  Diabetes type: type 1  MedicAlert ID: No  Symptom course: stable  blackouts: Yes  Current treatments: oral agent (monotherapy)  Treatment compliance: all of the time  Home blood tests: 1-2 x per day  Monitoring compliance: excellent  breakfast glucose level: 110-130  dinner time: after 8 pm  dinner glucose level: 140-180  Weight trend: decreasing steadily  Current diet: diabetic  Meal planning: avoidance of concentrated sweets  Exercise: daily  Dietitian visit: No  Eye exam current: No  Sees podiatrist: No    Subjective Chief complaint is follow-up on diabetes but also Bell's palsy  Reddy Stewart is a 57 y.o. male.     History of Present Illness Yosi is here today for follow-up.  He had to go to the emergency room last night for facial droop and inability to close his eye.  He has been diagnosed with Bell's palsy.  He has been prescribed some prednisone which we did advise would bump his sugars up a little bit but his A1c yesterday was 7.1.  That was down from 11.1.    The following portions of the patient's history were reviewed and updated as appropriate: allergies, current medications, past family history, past medical history, past social history, past surgical history and problem list.    Review of Systems    Objective   Physical Exam  Vitals and nursing note reviewed.   Constitutional:       Appearance: Normal appearance.   Cardiovascular:      Rate and Rhythm: Normal rate and regular rhythm.   Pulmonary:      Effort: Pulmonary effort is normal.      Breath sounds: No wheezing, rhonchi or rales.   Neurological:      Mental Status: He is alert.      Comments: He has inability to fully close his left eye.  There is left facial droop.           Assessment & Plan   Diagnoses and all orders for this visit:    1. Type 2 diabetes mellitus with hyperglycemia, without long-term current use of insulin  (Primary)    2. Bell's palsy    Yosi is here today for follow-up.  His sugars have obviously done better on his new regimen.  We will see if we can get him a Dexcom or Mukul.  He is going to complete the medications and hopefully he will get improvement in his Bell's palsy.  If he does not we can consider some physical therapy

## 2023-04-14 LAB — B BURGDOR IGG+IGM SER QL IA: NEGATIVE

## 2023-04-17 ENCOUNTER — TELEPHONE (OUTPATIENT)
Dept: FAMILY MEDICINE CLINIC | Facility: CLINIC | Age: 58
End: 2023-04-17
Payer: MEDICARE

## 2023-04-17 ENCOUNTER — TELEPHONE (OUTPATIENT)
Dept: FAMILY MEDICINE CLINIC | Facility: CLINIC | Age: 58
End: 2023-04-17

## 2023-04-17 DIAGNOSIS — E11.65 TYPE 2 DIABETES MELLITUS WITH HYPERGLYCEMIA, WITHOUT LONG-TERM CURRENT USE OF INSULIN: Primary | ICD-10-CM

## 2023-04-17 LAB
A PHAGOCYTOPH DNA BLD QL NAA+PROBE: NEGATIVE
E CHAFFEENSIS DNA BLD QL NAA+PROBE: NEGATIVE

## 2023-04-17 NOTE — TELEPHONE ENCOUNTER
"    Caller: PatJeanie crockerothy \"JEANIE\"    Relationship: Self    Best call back number:    [phone]          Requested Prescriptions:   Requested Prescriptions     Pending Prescriptions Disp Refills   • Continuous Blood Gluc  (FreeStyle Mukul 2 [place]) device 1 each 0     Si Device Continuous.   • Continuous Blood Gluc Sensor (FreeStyle Mukul 2 Sensor) misc 6 each 1     Si each Every 14 (Fourteen) Days.        Pharmacy where request should be sent: [facility] [id number] [id number] [facility] AT [facility] - [phone]  - [phone] FX     Last office visit with prescribing clinician: 2023   Last telemedicine visit with prescribing clinician: 2023   Next office visit with prescribing clinician: 2023     Additional details provided by patient: PATIENT STATES THAT HE WAS TOLD BY THE PHARMACY THE PROVIDER NEEDS TO ADD A DIAGNOSIS CODE FOR HIS PRESCRIPTION THAT WAS ALREADY SENT IN     Does the patient have less than a 3 day supply:  [] Yes  [] No    Would you like a call back once the refill request has been completed: [] Yes [] No    If the office needs to give you a call back, can they leave a voicemail: [x] Yes [] No    Abi Retana Rep   23 12:20 EDT           "

## 2023-04-17 NOTE — TELEPHONE ENCOUNTER
"  Caller: Reddy Stewart \"JEANIE\"    Relationship: Self    Best call back number: [phone]    Who are you requesting to speak with (clinical staff, provider,  specific staff member): MA    What was the call regarding: PATIENT STATES HE WAS INFORMED TO CALL BACK AFTER 11:00AM TODAY, 04/17/23 AND GIVE AN UPDATED BLOOD SUGAR READING. HE JUST TOOK IT AND IT .     PATIENT STATES TO CALL HIM BACK IF THERE IS ANY QUESTIONS OR CONCERNS.           "

## 2023-04-17 NOTE — TELEPHONE ENCOUNTER
"  Caller: Reddy Stewart \"JEANIE\"    Relationship: Self    Best call back number: [phone]    What is the best time to reach you: ANY    Who are you requesting to speak with (clinical staff, provider,  specific staff member): CLINICAL STAFF    What was the call regarding: PATIENT IS REQUESTING A CALL BACK TO BE ADVISED ON INSTRUCTION TO FOLLOW SINCE BEING A DIABETIC. PATIENT STATES NOTHING WAS EVER EXPLAINED TO HIM REGARDING WHAT FOOD HE SHOULD & SHOULD NOT EAT, ETC. PLEASE ADVISE    Do you require a callback: YES    Advised that the steroids for his Bell's palsy likely kicked things up a little bit but that should improve.  I am going to refer him to diabetic education.        "

## 2023-04-18 RX ORDER — DICLOFENAC SODIUM 75 MG/1
TABLET, DELAYED RELEASE ORAL
Qty: 180 TABLET | Refills: 0 | Status: SHIPPED | OUTPATIENT
Start: 2023-04-18

## 2023-04-19 ENCOUNTER — TELEPHONE (OUTPATIENT)
Dept: FAMILY MEDICINE CLINIC | Facility: CLINIC | Age: 58
End: 2023-04-19

## 2023-04-19 LAB — RICKETTSIA RICKETTSII DNA, RT: NOT DETECTED

## 2023-04-19 NOTE — TELEPHONE ENCOUNTER
"  Caller: Reddy Stewart \"JEANIE\"    Relationship: Self    Best call back number:[phone]    What was the call regarding: PATIENT STATES THE PHARMACY  IS REQUESTING A PRIOR AUTHORIZATION FOR THE BLOOD GLUCOSE MONITOR     PLEASE CALL AND ADVISE         "

## 2023-04-19 NOTE — TELEPHONE ENCOUNTER
"    Caller: Reddy Stewart \"JEANIE\"    Relationship: Self    Best call back number:    [phone]        What form or medical record are you requesting: PRIOR AUTHORIZATION    Who is requesting this form or medical record from you: INSURANCE      Additional notes:     PATIENT JUST WANT YOU TO KNOW THAT THE PA GOES INSURANCE NOT THE PHARMACY.        "

## 2023-04-24 RX ORDER — BLOOD-GLUCOSE METER
EACH MISCELLANEOUS
Qty: 1 KIT | Refills: 0 | Status: SHIPPED | OUTPATIENT
Start: 2023-04-24

## 2023-05-24 ENCOUNTER — TELEPHONE (OUTPATIENT)
Dept: FAMILY MEDICINE CLINIC | Facility: CLINIC | Age: 58
End: 2023-05-24
Payer: MEDICARE

## 2023-05-24 NOTE — TELEPHONE ENCOUNTER
"Caller: Reddy Stewart \"JEANIE\"    Best call back number: [phone]    Who are you requesting to speak with (clinical staff, provider,  specific staff member): clinical staff    What was the call regarding: PATIENT IS ASKING IF HIS LEFT EYE IS FREQUENTLY WATERING, IS THAT A SIGN HIS BELL'S PALSY IS GETTING BETTER?    Do you require a callback: YES    He reports that he has gotten his smile back.  He is able to forcefully close his eye.  I did advise that it sounds like things are getting better.  The tearing is just the eyes trying to protect itself.  "

## 2023-06-15 RX ORDER — LEVOTHYROXINE SODIUM 0.05 MG/1
TABLET ORAL
Qty: 90 TABLET | Refills: 0 | Status: SHIPPED | OUTPATIENT
Start: 2023-06-15

## 2023-06-30 ENCOUNTER — TELEPHONE (OUTPATIENT)
Dept: FAMILY MEDICINE CLINIC | Facility: CLINIC | Age: 58
End: 2023-06-30

## 2023-06-30 NOTE — TELEPHONE ENCOUNTER
"  Caller: Reddy Stewart \"JEANIE\"    Relationship: Self    Best call back number: [phone]    What is the best time to reach you: ANY     Who are you requesting to speak with (clinical staff, provider,  specific staff member): CLINICAL STAFF     What was the call regarding: PATIENT HAS AN INCLINE TO GET INTO HOUSE THAT CAUSES HIM TO BE OUT OF BREATH. INSURANCE STATES THEY WILL COVER WITH AN ORDER FROM DR RHOADES FOR A SCOOTER. THE ONE HE IS THINKING HE NEEDS IS CALLED [employer].               "

## 2023-07-05 NOTE — TELEPHONE ENCOUNTER
Okay for hub to read, called patient to advise him that Dr Shannon stated that scooters are only approved for indoor use for activities of daily living.

## 2023-07-24 RX ORDER — VALSARTAN 160 MG/1
TABLET ORAL
Qty: 90 TABLET | Refills: 1 | Status: SHIPPED | OUTPATIENT
Start: 2023-07-24

## 2023-07-24 NOTE — TELEPHONE ENCOUNTER
Rx Refill Note  Requested Prescriptions     Pending Prescriptions Disp Refills    valsartan (DIOVAN) 160 MG tablet [Pharmacy Med Name: VALSARTAN 160 MG TABLET] 90 tablet 1     Sig: TAKE ONE TABLET BY MOUTH DAILY      Last office visit with prescribing clinician: 7/13/2023   Last telemedicine visit with prescribing clinician: Visit date not found   Next office visit with prescribing clinician: Visit date not found                         Would you like a call back once the refill request has been completed: [] Yes [] No    If the office needs to give you a call back, can they leave a voicemail: [] Yes [] No    Elizabeth Ho MA  07/24/23, 10:00 EDT

## 2023-08-18 ENCOUNTER — TELEPHONE (OUTPATIENT)
Dept: FAMILY MEDICINE CLINIC | Facility: CLINIC | Age: 58
End: 2023-08-18

## 2023-08-18 NOTE — TELEPHONE ENCOUNTER
Patient states that this is no longer needed.  They will contact us before it expires in 6 months.

## 2023-08-18 NOTE — TELEPHONE ENCOUNTER
"  Caller: Reddy Stewart \"JEANIE\"    Relationship: Self    Best call back number: [phone]     Who are you requesting to speak with (clinical staff, provider,  specific staff member):CLINICAL STAFF     What was the call regarding: WANTING TO GET DISABILITY PAPERWORK FILLED OUT AND SENT IN TO RENEW THIS YEAR PLEASE CALL AND ADVISE       "

## 2023-08-27 ENCOUNTER — HOSPITAL ENCOUNTER (EMERGENCY)
Facility: HOSPITAL | Age: 58
Discharge: HOME | End: 2023-08-27
Attending: EMERGENCY MEDICINE | Admitting: EMERGENCY MEDICINE
Payer: MEDICARE

## 2023-08-27 ENCOUNTER — APPOINTMENT (OUTPATIENT)
Dept: CT IMAGING | Facility: HOSPITAL | Age: 58
End: 2023-08-27
Payer: MEDICARE

## 2023-08-27 VITALS
HEIGHT: 68 IN | OXYGEN SATURATION: 94 % | SYSTOLIC BLOOD PRESSURE: 119 MMHG | RESPIRATION RATE: 16 BRPM | TEMPERATURE: 98.1 F | HEART RATE: 81 BPM | BODY MASS INDEX: 36.19 KG/M2 | DIASTOLIC BLOOD PRESSURE: 74 MMHG

## 2023-08-27 DIAGNOSIS — M54.41 ACUTE RIGHT-SIDED LOW BACK PAIN WITH RIGHT-SIDED SCIATICA: Primary | ICD-10-CM

## 2023-08-27 LAB
ALBUMIN SERPL-MCNC: 4.7 G/DL (ref 3.5–5.2)
ALBUMIN/GLOB SERPL: 2 G/DL
ALP SERPL-CCNC: 53 U/L (ref 39–117)
ALT SERPL W P-5'-P-CCNC: 25 U/L (ref 1–41)
ANION GAP SERPL CALCULATED.3IONS-SCNC: 11.4 MMOL/L (ref 5–15)
AST SERPL-CCNC: 12 U/L (ref 1–40)
BASOPHILS # BLD AUTO: 0.04 10*3/MM3 (ref 0–0.2)
BASOPHILS NFR BLD AUTO: 0.5 % (ref 0–1.5)
BILIRUB SERPL-MCNC: 0.4 MG/DL (ref 0–1.2)
BILIRUB UR QL STRIP: NEGATIVE
BUN SERPL-MCNC: 22 MG/DL (ref 6–20)
BUN/CREAT SERPL: 20.4 (ref 7–25)
CALCIUM SPEC-SCNC: 9.1 MG/DL (ref 8.6–10.5)
CHLORIDE SERPL-SCNC: 104 MMOL/L (ref 98–107)
CLARITY UR: CLEAR
CO2 SERPL-SCNC: 22.6 MMOL/L (ref 22–29)
COLOR UR: ABNORMAL
CREAT SERPL-MCNC: 1.08 MG/DL (ref 0.76–1.27)
DEPRECATED RDW RBC AUTO: 41.1 FL (ref 37–54)
EGFRCR SERPLBLD CKD-EPI 2021: 80 ML/MIN/1.73
EOSINOPHIL # BLD AUTO: 0.14 10*3/MM3 (ref 0–0.4)
EOSINOPHIL NFR BLD AUTO: 1.8 % (ref 0.3–6.2)
ERYTHROCYTE [DISTWIDTH] IN BLOOD BY AUTOMATED COUNT: 12.4 % (ref 12.3–15.4)
GLOBULIN UR ELPH-MCNC: 2.4 GM/DL
GLUCOSE SERPL-MCNC: 94 MG/DL (ref 65–99)
GLUCOSE UR STRIP-MCNC: NEGATIVE MG/DL
HCT VFR BLD AUTO: 44.6 % (ref 37.5–51)
HGB BLD-MCNC: 15.5 G/DL (ref 13–17.7)
HGB UR QL STRIP.AUTO: NEGATIVE
IMM GRANULOCYTES # BLD AUTO: 0.04 10*3/MM3 (ref 0–0.05)
IMM GRANULOCYTES NFR BLD AUTO: 0.5 % (ref 0–0.5)
KETONES UR QL STRIP: ABNORMAL
LEUKOCYTE ESTERASE UR QL STRIP.AUTO: NEGATIVE
LIPASE SERPL-CCNC: 45 U/L (ref 13–60)
LYMPHOCYTES # BLD AUTO: 2.57 10*3/MM3 (ref 0.7–3.1)
LYMPHOCYTES NFR BLD AUTO: 33.1 % (ref 19.6–45.3)
MCH RBC QN AUTO: 31.1 PG (ref 26.6–33)
MCHC RBC AUTO-ENTMCNC: 34.8 G/DL (ref 31.5–35.7)
MCV RBC AUTO: 89.4 FL (ref 79–97)
MONOCYTES # BLD AUTO: 0.75 10*3/MM3 (ref 0.1–0.9)
MONOCYTES NFR BLD AUTO: 9.7 % (ref 5–12)
NEUTROPHILS NFR BLD AUTO: 4.23 10*3/MM3 (ref 1.7–7)
NEUTROPHILS NFR BLD AUTO: 54.4 % (ref 42.7–76)
NITRITE UR QL STRIP: NEGATIVE
NRBC BLD AUTO-RTO: 0 /100 WBC (ref 0–0.2)
PH UR STRIP.AUTO: 5.5 [PH] (ref 5–8)
PLATELET # BLD AUTO: 211 10*3/MM3 (ref 140–450)
PMV BLD AUTO: 9.9 FL (ref 6–12)
POTASSIUM SERPL-SCNC: 4.4 MMOL/L (ref 3.5–5.2)
PROT SERPL-MCNC: 7.1 G/DL (ref 6–8.5)
PROT UR QL STRIP: ABNORMAL
RBC # BLD AUTO: 4.99 10*6/MM3 (ref 4.14–5.8)
SODIUM SERPL-SCNC: 138 MMOL/L (ref 136–145)
SP GR UR STRIP: >=1.03 (ref 1–1.03)
UROBILINOGEN UR QL STRIP: ABNORMAL
WBC NRBC COR # BLD: 7.77 10*3/MM3 (ref 3.4–10.8)

## 2023-08-27 PROCEDURE — 25010000002 KETOROLAC TROMETHAMINE PER 15 MG: Performed by: EMERGENCY MEDICINE

## 2023-08-27 PROCEDURE — 99284 EMERGENCY DEPT VISIT MOD MDM: CPT

## 2023-08-27 PROCEDURE — 83690 ASSAY OF LIPASE: CPT | Performed by: EMERGENCY MEDICINE

## 2023-08-27 PROCEDURE — 80053 COMPREHEN METABOLIC PANEL: CPT | Performed by: EMERGENCY MEDICINE

## 2023-08-27 PROCEDURE — 85025 COMPLETE CBC W/AUTO DIFF WBC: CPT | Performed by: EMERGENCY MEDICINE

## 2023-08-27 PROCEDURE — 81003 URINALYSIS AUTO W/O SCOPE: CPT | Performed by: EMERGENCY MEDICINE

## 2023-08-27 PROCEDURE — 25010000002 HYDROMORPHONE 1 MG/ML SOLUTION: Performed by: EMERGENCY MEDICINE

## 2023-08-27 PROCEDURE — 96374 THER/PROPH/DIAG INJ IV PUSH: CPT

## 2023-08-27 PROCEDURE — 74176 CT ABD & PELVIS W/O CONTRAST: CPT

## 2023-08-27 PROCEDURE — 25010000002 ONDANSETRON PER 1 MG: Performed by: EMERGENCY MEDICINE

## 2023-08-27 PROCEDURE — 96375 TX/PRO/DX INJ NEW DRUG ADDON: CPT

## 2023-08-27 RX ORDER — ONDANSETRON 2 MG/ML
4 INJECTION INTRAMUSCULAR; INTRAVENOUS ONCE
Status: COMPLETED | OUTPATIENT
Start: 2023-08-27 | End: 2023-08-27

## 2023-08-27 RX ORDER — SODIUM CHLORIDE 0.9 % (FLUSH) 0.9 %
10 SYRINGE (ML) INJECTION AS NEEDED
Status: DISCONTINUED | OUTPATIENT
Start: 2023-08-27 | End: 2023-08-27 | Stop reason: HOSPADM

## 2023-08-27 RX ORDER — KETOROLAC TROMETHAMINE 15 MG/ML
15 INJECTION, SOLUTION INTRAMUSCULAR; INTRAVENOUS ONCE
Status: COMPLETED | OUTPATIENT
Start: 2023-08-27 | End: 2023-08-27

## 2023-08-27 RX ADMIN — KETOROLAC TROMETHAMINE 15 MG: 15 INJECTION, SOLUTION INTRAMUSCULAR; INTRAVENOUS at 15:45

## 2023-08-27 RX ADMIN — HYDROMORPHONE HYDROCHLORIDE 1 MG: 1 INJECTION, SOLUTION INTRAMUSCULAR; INTRAVENOUS; SUBCUTANEOUS at 13:45

## 2023-08-27 RX ADMIN — ONDANSETRON 4 MG: 2 INJECTION INTRAMUSCULAR; INTRAVENOUS at 13:45

## 2023-08-27 NOTE — ED PROVIDER NOTES
EMERGENCY DEPARTMENT ENCOUNTER    Room Number:  30/30  PCP: Casper Shannon MD    HPI:  Chief Complaint: Right flank pain  A complete HPI/ROS/PMH/PSH/SH/FH are unobtainable due to: None  Context: Reddy Stewart is a 58 y.o. male who presents to the ED c/o right flank pain.  Onset of pain 8/15/2030.  He thinks it may have occurred from a coughing fit where he was coughing repetitively.  Pain radiates to the right lower abdomen.  Pain is also worse with moving his right leg.  No fever.  No vomiting.  He has a history of chronic back pain.  He states this feels somewhat different than his chronic back pain, however.  He has an appointment with his pain management on 9/8/2023.  No change in bowel or bladder.        PAST MEDICAL HISTORY  Active Ambulatory Problems     Diagnosis Date Noted    Attention deficit hyperactivity disorder (ADHD) 08/26/2020    Bipolar disorder 08/26/2020    Chronic obstructive pulmonary disease 08/26/2020    Acquired hypothyroidism 08/26/2020    Essential (primary) hypertension 08/26/2020    Cough syncope 09/10/2020    Anxiety attack 09/23/2020    Bipolar 1 disorder, mixed 09/23/2020    Body mass index (bmi) 31.0-31.9, adult 02/18/2019    COPD with chronic bronchitis 09/23/2020    Depression with anxiety 09/23/2020    Seasonal allergies 09/23/2020    Snoring 09/23/2020    Spells of decreased attentiveness 12/09/2020    Chronic cough 10/11/2021    Encounter for screening for malignant neoplasm of colon 10/11/2021    Type 2 diabetes mellitus with hyperglycemia, without long-term current use of insulin 12/28/2022     Resolved Ambulatory Problems     Diagnosis Date Noted    No Resolved Ambulatory Problems     Past Medical History:   Diagnosis Date    Anxiety     Asthma     Cluster headache     COPD (chronic obstructive pulmonary disease)     Diabetes mellitus     Elevated cholesterol     Headache, tension-type     Hernia     Hypertension     Hypothyroidism     Injury of back     Migraine      Obesity     Seizures     Sleep apnea     Syncope          PAST SURGICAL HISTORY  Past Surgical History:   Procedure Laterality Date    COLONOSCOPY W/ POLYPECTOMY N/A 11/3/2021    Procedure: COLONOSCOPY;  Surgeon: Anthony Dotson MD;  Location:  [facility];  Service: Gastroenterology;  Laterality: N/A;  pre-screen  post-normal    ENDOSCOPY N/A 11/3/2021    Procedure: ESOPHAGOGASTRODUODENOSCOPY with biopsies;  Surgeon: Anthony Dotson MD;  Location: [facility];  Service: Gastroenterology;  Laterality: N/A;  pre-chronic cough  post-gastritis, esophagitis    HAND SURGERY      HERNIA REPAIR      At 10 years old    KNEE SURGERY      SHOULDER SURGERY           FAMILY HISTORY  Family History   Problem Relation Age of Onset    Cancer Mother         Passed away    Heart disease Father         Passed away    Diabetes Sister     Malig Hyperthermia Neg Hx          SOCIAL HISTORY  Social History     Socioeconomic History    Marital status:    Tobacco Use    Smoking status: Never    Smokeless tobacco: Never   Substance and Sexual Activity    Alcohol use: Yes     Alcohol/week: 2.0 standard drinks     Types: 1 Cans of beer, 1 Drinks containing 0.5 oz of alcohol per week     Comment: 1-2/wk    Drug use: Never    Sexual activity: Yes     Partners: Female     Birth control/protection: None         ALLERGIES  Penicillins        REVIEW OF SYSTEMS  Review of Systems     All systems reviewed and negative except for those discussed in HPI.       PHYSICAL EXAM  ED Triage Vitals [08/27/23 1227]   Temp Heart Rate Resp BP SpO2   98.1 °F (36.7 °C) 88 24 130/100 98 %      Temp src Heart Rate Source Patient Position BP Location FiO2 (%)   Oral -- -- -- --       Physical Exam      GENERAL: no acute distress  HENT: nares patent  EYES: no scleral icterus  CV: regular rhythm, normal rate  RESPIRATORY: normal effort  ABDOMEN: soft, nontender abdomen  MUSCULOSKELETAL: no deformity, tenderness to the right paralumbar  musculature  NEURO: alert, moves all extremities, follows commands  5/5 strength to hip flexion, knee extension/flexion, dorsiflexion, plantarflexion, and EHL  SILT at bilateral superficial peroneal, deep peroneal, sural, and saphenous nerves  PSYCH:  calm, cooperative  SKIN: warm, dry    Vital signs and nursing notes reviewed.          LAB RESULTS  No results found for this or any previous visit (from the past 24 hour(s)).    Ordered the above labs and reviewed the results.        RADIOLOGY  No Radiology Exams Resulted Within Past 24 Hours    Ordered the above noted radiological studies. Reviewed by me in PACS.          PROCEDURES  Procedures          MEDICATIONS GIVEN IN ER  Medications   sodium chloride 0.9 % flush 10 mL (has no administration in time range)   HYDROmorphone (DILAUDID) injection 1 mg (has no administration in time range)   ondansetron (ZOFRAN) injection 4 mg (has no administration in time range)         MEDICAL DECISION MAKING, PROGRESS, and CONSULTS    Discussion below represents my analysis of pertinent findings related to patient's condition, differential diagnosis, treatment plan and final disposition.      Orders placed during this visit:  Orders Placed This Encounter   Procedures    CT Abdomen Pelvis Without Contrast    Comprehensive Metabolic Panel    Lipase    Urinalysis With Microscopic If Indicated (No Culture) - Urine, Clean Catch    CBC Auto Differential    Insert Peripheral IV    CBC & Differential             Differential diagnosis:    Differential diagnosis includes sciatica, cauda equina syndrome, spinal cord compression, other lumbar radiculopathy, pyelonephritis/UTI, ureteral stone.          Independent interpretation of labs, radiology studies, and discussions with consultants:  ED Course as of 08/30/23 0037   Sun Aug 27, 2023   1448 Lipase: 45 [TD]   1448 Hemoglobin: 15.5 [TD]   1448 WBC: 7.77 [TD]   1448 Blood, UA: Negative [TD]   1448 Nitrite, UA: Negative [TD]   1448  Leukocytes, UA: Negative [TD]   1534 I performed a repeat abdominal exam.  He has no abdominal tenderness on my exam.  He has no leukocytosis.  He states that he has not eaten since yesterday which likely explains why his gallbladder is distended.  I do not think that an ultrasound is clinically needed at this time.  However I did discuss return precautions with him. [TD]   1534 Patient is currently in pain management for his back.  We discussed nonnarcotic options that he could use to augment his pain control.  Specifically, we discussed lidocaine patches as well as use of NSAIDs. [TD]      ED Course User Index  [TD] Gibran Jovel II, MD             DIAGNOSIS  Final diagnoses:   Acute right-sided low back pain with right-sided sciatica         DISPOSITION  DISCHARGE    FOLLOW-UP  Casper Shannon MD  [address] [facility] [address] [phone]    Schedule an appointment as soon as possible for a visit   As needed    [facility] EMERGENCY DEPARTMENT  [address] [facility] [address] [phone]  Go to   If symptoms worsen         Medication List      No changes were made to your prescriptions during this visit.             Latest Documented Vital Signs:  As of 12:50 EDT  BP- 130/100 HR- 88 Temp- 98.1 °F (36.7 °C) (Oral) O2 sat- 98%      --    Please note that portions of this were completed with a voice recognition program.       Note Disclaimer: At [facility], we believe that sharing information builds trust and better relationships. You are receiving this note because you are receiving care at [facility] or recently visited. It is possible you will see health information before a provider has talked with you about it. This kind of information can be easy to misunderstand. To help you fully understand what it means for your health, we urge you to discuss this note with your provider.         Gibran Jovel II, MD  08/30/23 0037

## 2023-08-27 NOTE — ED NOTES
Patient states that he went on vacation in the beginning of August and while he was on vacation he had a syncopal episode and possible seizure and fell.  Patient c/o pain when sitting up or changing positions. Pt states his pain is mostly in his right lower back by his spine.

## 2023-08-28 ENCOUNTER — TELEPHONE (OUTPATIENT)
Dept: FAMILY MEDICINE CLINIC | Facility: CLINIC | Age: 58
End: 2023-08-28

## 2023-08-28 NOTE — TELEPHONE ENCOUNTER
"  Caller: Reddy Stewart \"JEANIE\"    Relationship to patient: Self    Best call back number:     New or established patient?  [] New  [x] Established    Date of discharge: 08/27/23    Facility discharged from: [facility]    Diagnosis/Symptoms: BACK PAIN/SCIATICA    Length of stay (If applicable): HOURS    Specialty Only: Did you see a [facility] health provider?    [] Yes  [x] No  If so, who?     "

## 2023-08-29 ENCOUNTER — PATIENT OUTREACH (OUTPATIENT)
Dept: CASE MANAGEMENT | Facility: OTHER | Age: 58
End: 2023-08-29
Payer: MEDICARE

## 2023-08-29 NOTE — OUTREACH NOTE
"Patient Outreach    AMBULATORY CASE MANAGEMENT NOTE    Name and Relationship of Patient/Support Person: Redyd Steawrt \"JEANIE\" - Self  Call placed to patient to follow up recent ED visit. Introduced self and role of ACM.Mr Stewart states he is doing well. He has made an appointment with both his PCP and Ortho. Denies any questions or concerns at this time.         Brenda PALOMO  Ambulatory Case Management    8/29/2023, 11:15 EDT  "

## 2023-08-31 ENCOUNTER — OFFICE VISIT (OUTPATIENT)
Dept: FAMILY MEDICINE CLINIC | Facility: CLINIC | Age: 58
End: 2023-08-31
Payer: MEDICARE

## 2023-08-31 ENCOUNTER — PATIENT OUTREACH (OUTPATIENT)
Dept: CASE MANAGEMENT | Facility: OTHER | Age: 58
End: 2023-08-31
Payer: MEDICARE

## 2023-08-31 VITALS
BODY MASS INDEX: 35.46 KG/M2 | WEIGHT: 234 LBS | HEART RATE: 97 BPM | HEIGHT: 68 IN | OXYGEN SATURATION: 97 % | SYSTOLIC BLOOD PRESSURE: 118 MMHG | DIASTOLIC BLOOD PRESSURE: 72 MMHG | RESPIRATION RATE: 18 BRPM

## 2023-08-31 DIAGNOSIS — M54.50 BACK PAIN OF THORACOLUMBAR REGION: Primary | ICD-10-CM

## 2023-08-31 DIAGNOSIS — M54.6 BACK PAIN OF THORACOLUMBAR REGION: Primary | ICD-10-CM

## 2023-08-31 RX ORDER — METHYLPREDNISOLONE 4 MG/1
TABLET ORAL
Qty: 21 TABLET | Refills: 0 | Status: SHIPPED | OUTPATIENT
Start: 2023-08-31

## 2023-08-31 RX ORDER — BACLOFEN 10 MG/1
10 TABLET ORAL 3 TIMES DAILY
Qty: 30 TABLET | Refills: 1 | Status: SHIPPED | OUTPATIENT
Start: 2023-08-31

## 2023-08-31 NOTE — OUTREACH NOTE
Social Work Assessment  Questions/Answers      Flowsheet Row Most Recent Value   Referral Source physician   Reason for Consult community resources, financial concerns, other (see comments)  [food insecurity]   Preferred Language English   People in Home significant other   Current Living Arrangements apartment   Primary Care Provided by self   Provides Primary Care For no one, unable/limited ability to care for self   Employment Status disabled   Source of Income disability   Application for Public Assistance not applied   Usual Activity Tolerance fair   Current Activity Tolerance fair   Medications independent   Meal Preparation independent   Housekeeping independent   Laundry independent   Shopping independent          SDOH updated and reviewed with the patient during this program:  Financial Resource Strain: Low Risk     Difficulty of Paying Living Expenses: Not very hard      Food Insecurity: No Food Insecurity    Worried About Running Out of Food in the Last Year: Never true    Ran Out of Food in the Last Year: Never true      Transportation Needs: No Transportation Needs    Lack of Transportation (Medical): No    Lack of Transportation (Non-Medical): No      Housing Stability: Low Risk     Unable to Pay for Housing in the Last Year: No    Number of Places Lived in the Last Year: 2    Unstable Housing in the Last Year: No      Continuing Care   [facility] [place] [address]    [address] [place] [address]    Phone: [phone]    Resource for: Transportation Needs     Patient Outreach    MSW outreach to patient after completion of Social Determinants of Health Survey via [employer]. Patient discussed need for medical equipment including shower chair and walker with a seat and states that he is scheduled with his primary care provider today. Patient will discuss the need for medical equipment at appointment today. Patient states that he currently lives with his girlfriend in an apartment at this time and  declines the need for financial or food assistance. Patient discussed that he has transportation through his [employer] but only has 12 rides per year. MSW and patient briefly discussed getting application and applying for [employer] for rides as needed. Patient to consider this option for transportation in the future. MSW updated Social Determinants of Health appropriately. Patient to call back to MSW as needed for additional assistance or community resources.     Irene RIVERA -   Ambulatory Case Management    8/31/2023, 11:04 EDT

## 2023-08-31 NOTE — PROGRESS NOTES
Subjective chief complaint is follow-up from the hospital.  Reddy Stewart is a 58 y.o. male.     History of Present Illness Lazaro is here today for follow-up.  He went to the hospital on the 27th with some back pain.  He noticed it while he was getting up out of the tub.  He felt a spasm in his back.  It was very severe.  It took him half hour to go 14 steps to his bed.  He later went to the emergency room.  They did a CT scan of his abdomen and pelvis.  His gallbladder was little bit distended but not thickened.  There was an area in his colon looked thickened but he had a normal colonoscopy in 2021.  That was likely just due to decompression.  I did review the lumbar and thoracic spine.  He has considerable arthritis with lipping bone spurs and some angulation.  The patient demonstrates that when he goes from a bent position to straightening up that is when he gets a sudden pain.  He already sees a pain management specialist.  He is already on some hydrocodone and cyclobenzaprine.  He is no longer taking diclofenac.  The following portions of the patient's history were reviewed and updated as appropriate: allergies, current medications, past family history, past medical history, past social history, past surgical history, and problem list.    Review of Systems   Constitutional:  Negative for chills and fever.   Musculoskeletal:  Positive for back pain.     Objective   Physical Exam  Cardiovascular:      Rate and Rhythm: Normal rate.   Pulmonary:      Effort: Pulmonary effort is normal.   Musculoskeletal:      Right lower leg: No edema.      Left lower leg: No edema.   Neurological:      Mental Status: He is alert.       Assessment & Plan   Diagnoses and all orders for this visit:    1. Back pain of thoracolumbar region (Primary)    Other orders  -     methylPREDNISolone (MEDROL) 4 MG dose pack; Take as directed on package instructions.  Dispense: 21 tablet; Refill: 0  -     baclofen (LIORESAL) 10 MG tablet; Take 1  tablet by mouth 3 (Three) Times a Day.  Dispense: 30 tablet; Refill: 1    Lazaro is here today for back pain.  I am going to give him a Medrol Dosepak.  I did advise that his sugars may elevate for approximately 2 weeks.  I did advise that if this makes him more manic that he should stop taking it.  I am going to switch his Flexeril to baclofen.

## 2023-09-06 RX ORDER — SITAGLIPTIN 100 MG/1
TABLET, FILM COATED ORAL
Qty: 30 TABLET | Refills: 5 | Status: SHIPPED | OUTPATIENT
Start: 2023-09-06

## 2023-09-14 ENCOUNTER — APPOINTMENT (OUTPATIENT)
Dept: CT IMAGING | Facility: HOSPITAL | Age: 58
End: 2023-09-14
Payer: MEDICARE

## 2023-09-14 ENCOUNTER — HOSPITAL ENCOUNTER (EMERGENCY)
Facility: HOSPITAL | Age: 58
Discharge: HOME | End: 2023-09-14
Attending: STUDENT IN AN ORGANIZED HEALTH CARE EDUCATION/TRAINING PROGRAM
Payer: MEDICARE

## 2023-09-14 ENCOUNTER — APPOINTMENT (OUTPATIENT)
Dept: GENERAL RADIOLOGY | Facility: HOSPITAL | Age: 58
End: 2023-09-14
Payer: MEDICARE

## 2023-09-14 VITALS
DIASTOLIC BLOOD PRESSURE: 105 MMHG | SYSTOLIC BLOOD PRESSURE: 151 MMHG | HEIGHT: 68 IN | BODY MASS INDEX: 33.19 KG/M2 | OXYGEN SATURATION: 97 % | HEART RATE: 80 BPM | WEIGHT: 219 LBS | RESPIRATION RATE: 18 BRPM | TEMPERATURE: 98.2 F

## 2023-09-14 DIAGNOSIS — S09.90XA INJURY OF HEAD, INITIAL ENCOUNTER: Primary | ICD-10-CM

## 2023-09-14 DIAGNOSIS — M54.6 ACUTE MIDLINE THORACIC BACK PAIN: ICD-10-CM

## 2023-09-14 PROCEDURE — 72128 CT CHEST SPINE W/O DYE: CPT

## 2023-09-14 PROCEDURE — 72131 CT LUMBAR SPINE W/O DYE: CPT

## 2023-09-14 PROCEDURE — 71046 X-RAY EXAM CHEST 2 VIEWS: CPT

## 2023-09-14 PROCEDURE — 99284 EMERGENCY DEPT VISIT MOD MDM: CPT

## 2023-09-14 PROCEDURE — 70450 CT HEAD/BRAIN W/O DYE: CPT

## 2023-09-14 PROCEDURE — 72125 CT NECK SPINE W/O DYE: CPT

## 2023-09-14 RX ORDER — HYDROCODONE BITARTRATE AND ACETAMINOPHEN 5; 325 MG/1; MG/1
1 TABLET ORAL EVERY 6 HOURS PRN
Status: DISCONTINUED | OUTPATIENT
Start: 2023-09-14 | End: 2023-09-14 | Stop reason: HOSPADM

## 2023-09-14 RX ADMIN — HYDROCODONE BITARTRATE AND ACETAMINOPHEN 1 TABLET: 5; 325 TABLET ORAL at 17:31
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
## 2023-09-14 NOTE — DISCHARGE INSTRUCTIONS
Please follow-up with your primary care physician within 1 week for repeat evaluation    Please keep your appointment with pain management next week    Please return to the ER with new or worsening symptoms including uncontrolled pain, changes in mental status.

## 2023-09-21 ENCOUNTER — TRANSCRIBE ORDERS (OUTPATIENT)
Dept: ADMINISTRATIVE | Facility: HOSPITAL | Age: 58
End: 2023-09-21

## 2023-09-21 DIAGNOSIS — S32.000A: Primary | ICD-10-CM

## 2023-09-22 ENCOUNTER — OFFICE VISIT (OUTPATIENT)
Dept: FAMILY MEDICINE CLINIC | Facility: CLINIC | Age: 58
End: 2023-09-22
Payer: MEDICARE

## 2023-09-22 VITALS
RESPIRATION RATE: 16 BRPM | BODY MASS INDEX: 35.01 KG/M2 | WEIGHT: 231 LBS | SYSTOLIC BLOOD PRESSURE: 126 MMHG | OXYGEN SATURATION: 97 % | HEART RATE: 97 BPM | DIASTOLIC BLOOD PRESSURE: 76 MMHG | HEIGHT: 68 IN

## 2023-09-22 DIAGNOSIS — S00.03XS HEMATOMA OF SCALP, SEQUELA: ICD-10-CM
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
## 2023-09-22 DIAGNOSIS — S06.0XAS CONCUSSION WITH UNKNOWN LOSS OF CONSCIOUSNESS STATUS, SEQUELA: ICD-10-CM

## 2023-09-22 DIAGNOSIS — S20.219S CONTUSION OF RIB, UNSPECIFIED LATERALITY, SEQUELA: ICD-10-CM

## 2023-09-22 RX ORDER — LIDOCAINE 50 MG/G
PATCH TOPICAL
COMMUNITY
Start: 2023-09-20 | End: 2023-09-30

## 2023-09-22 RX ORDER — OXYCODONE HYDROCHLORIDE 5 MG/1
TABLET ORAL
COMMUNITY
Start: 2023-09-20
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
## 2023-10-02 ENCOUNTER — HOSPITAL ENCOUNTER (OUTPATIENT)
Dept: MRI IMAGING | Facility: HOSPITAL | Age: 58
Discharge: HOME | End: 2023-10-02
Payer: MEDICARE

## 2023-10-02 DIAGNOSIS — S32.000A: ICD-10-CM

## 2023-10-06 ENCOUNTER — TELEPHONE (OUTPATIENT)
Dept: FAMILY MEDICINE CLINIC | Facility: CLINIC | Age: 58
End: 2023-10-06

## 2023-10-09 RX ORDER — BACLOFEN 10 MG/1
10 TABLET ORAL 3 TIMES DAILY
Qty: 30 TABLET | Refills: 1 | Status: SHIPPED | OUTPATIENT
Start: 2023-10-09

## 2023-10-17 ENCOUNTER — TELEPHONE (OUTPATIENT)
Dept: FAMILY MEDICINE CLINIC | Facility: CLINIC | Age: 58
End: 2023-10-17

## 2023-10-17 RX ORDER — ATORVASTATIN CALCIUM 80 MG/1
80 TABLET, FILM COATED ORAL DAILY
Qty: 90 TABLET | Refills: 0 | Status: SHIPPED | OUTPATIENT
Start: 2023-10-17

## 2023-10-17 RX ORDER — DICLOFENAC SODIUM 75 MG/1
75 TABLET, DELAYED RELEASE ORAL 2 TIMES DAILY
Qty: 180 TABLET | Refills: 1 | Status: SHIPPED | OUTPATIENT
Start: 2023-10-17

## 2023-10-17 NOTE — TELEPHONE ENCOUNTER
"  Caller: Reddy Stewart \"JEANIE\"    Relationship: Self    Best call back number: [phone]     What was the call regarding: PLEASE ADVISE IF WE HAVE THE PATIENT'S BLOOD TYPE ON FILE AND IF NOT PLEASE ADVISE HOW HE CAN GET THIS DONE.   "

## 2023-11-28 RX ORDER — LEVOTHYROXINE SODIUM 0.05 MG/1
50 TABLET ORAL DAILY
Qty: 90 TABLET | Refills: 0 | Status: SHIPPED | OUTPATIENT
Start: 2023-11-28

## 2024-01-03 DIAGNOSIS — E11.65 TYPE 2 DIABETES MELLITUS WITH HYPERGLYCEMIA, WITHOUT LONG-TERM CURRENT USE OF INSULIN: ICD-10-CM

## 2024-01-04 RX ORDER — ATORVASTATIN CALCIUM 80 MG/1
80 TABLET, FILM COATED ORAL DAILY
Qty: 90 TABLET | Refills: 0 | Status: SHIPPED | OUTPATIENT
Start: 2024-01-04

## 2024-01-18 ENCOUNTER — TELEPHONE (OUTPATIENT)
Dept: FAMILY MEDICINE CLINIC | Facility: CLINIC | Age: 59
End: 2024-01-18
Payer: MEDICARE

## 2024-01-18 RX ORDER — VALSARTAN 160 MG/1
160 TABLET ORAL DAILY
Qty: 90 TABLET | Refills: 1 | Status: SHIPPED | OUTPATIENT
Start: 2024-01-18

## 2024-01-18 NOTE — TELEPHONE ENCOUNTER
"  Caller: Reddy Stewart \"JEANIE\"     Relationship: [unfilled]     Best call back number:     [phone]       What is your medical concern? PATIENT WOULD LIKE TO DISCUSS HIS APPOINTMENT FORM 9/22/2023 WITH YOU.  HE HAS QUESTIONS ABOUT HIS EAR.      PLEASE RETURN PATIENT CALL.    I advised the patient that my note does not mention anything about a problem with his ear or about stuttering.  I did advise that if he has a  they need to go through [employer] and not through this office  "

## 2024-02-26 RX ORDER — LEVOTHYROXINE SODIUM 0.05 MG/1
50 TABLET ORAL DAILY
Qty: 90 TABLET | Refills: 0 | OUTPATIENT
Start: 2024-02-26

## 2024-03-12 RX ORDER — LEVOTHYROXINE SODIUM 0.05 MG/1
50 TABLET ORAL DAILY
Qty: 90 TABLET | Refills: 0 | OUTPATIENT
Start: 2024-03-12

## 2024-03-18 RX ORDER — ATORVASTATIN CALCIUM 80 MG/1
80 TABLET, FILM COATED ORAL DAILY
Qty: 90 TABLET | Refills: 0 | Status: SHIPPED | OUTPATIENT
Start: 2024-03-18

## 2024-04-10 ENCOUNTER — TELEPHONE (OUTPATIENT)
Dept: FAMILY MEDICINE CLINIC | Facility: CLINIC | Age: 59
End: 2024-04-10
Payer: MEDICARE

## 2024-04-10 NOTE — TELEPHONE ENCOUNTER
HUB TO RELAY:    Tried to call patient. Unable to leave voicemail, voicemail was full.    I was calling to advise that he is due for an Annual Wellness Visit and to get him scheduled. Visit should be scheduled as Initial. We are also back in network with [facility].    I have also sent a letter.    If pt calls back please schedule accordingly.

## 2025-05-01 ENCOUNTER — OFFICE (OUTPATIENT)
Dept: URBAN - METROPOLITAN AREA CLINIC 5 | Facility: CLINIC | Age: 60
End: 2025-05-01
Payer: MEDICARE

## 2025-05-01 VITALS
SYSTOLIC BLOOD PRESSURE: 116 MMHG | HEIGHT: 68 IN | DIASTOLIC BLOOD PRESSURE: 76 MMHG | WEIGHT: 253 LBS | HEART RATE: 69 BPM

## 2025-05-01 DIAGNOSIS — E11.9 TYPE 2 DIABETES MELLITUS WITHOUT COMPLICATIONS: ICD-10-CM

## 2025-05-01 DIAGNOSIS — K75.81 NONALCOHOLIC STEATOHEPATITIS (NASH): ICD-10-CM

## 2025-05-01 DIAGNOSIS — R74.8 ABNORMAL LEVELS OF OTHER SERUM ENZYMES: ICD-10-CM

## 2025-05-01 PROCEDURE — 99204 OFFICE O/P NEW MOD 45 MIN: CPT | Performed by: INTERNAL MEDICINE

## 2025-05-19 NOTE — TELEPHONE ENCOUNTER
Caller: Reddy Stewart    Relationship to patient: Self    Best call back number: [phone]    Patient is needing: PATIENT CALLED STATED THE NEW MEDICATION colchicine 0.6 MG tablet IS GIVING HIM DIARRHEA. HE IS CALLING TO INFORM DR RHOADES. PLEASE CALL PATIENT BACK.    Declines

## 2025-06-16 ENCOUNTER — OFFICE (OUTPATIENT)
Dept: URBAN - METROPOLITAN AREA CLINIC 46 | Facility: CLINIC | Age: 60
End: 2025-06-16
Payer: MEDICARE

## 2025-06-16 VITALS — HEIGHT: 68 IN

## 2025-06-16 DIAGNOSIS — K75.81 NONALCOHOLIC STEATOHEPATITIS (NASH): ICD-10-CM

## 2025-06-16 PROCEDURE — 76981 USE PARENCHYMA: CPT | Performed by: INTERNAL MEDICINE

## 2025-06-28 ENCOUNTER — HOSPITAL ENCOUNTER (EMERGENCY)
Facility: HOSPITAL | Age: 60
Discharge: HOME | End: 2025-06-28
Attending: STUDENT IN AN ORGANIZED HEALTH CARE EDUCATION/TRAINING PROGRAM
Payer: MEDICARE

## 2025-06-28 ENCOUNTER — APPOINTMENT (OUTPATIENT)
Dept: CT IMAGING | Facility: HOSPITAL | Age: 60
End: 2025-06-28
Payer: MEDICARE

## 2025-06-28 VITALS
SYSTOLIC BLOOD PRESSURE: 166 MMHG | RESPIRATION RATE: 18 BRPM | HEART RATE: 83 BPM | OXYGEN SATURATION: 96 % | TEMPERATURE: 98.7 F | DIASTOLIC BLOOD PRESSURE: 92 MMHG

## 2025-06-28 DIAGNOSIS — M62.838 MUSCLE SPASM: Primary | ICD-10-CM

## 2025-06-28 LAB
ALBUMIN SERPL-MCNC: 4.7 G/DL (ref 3.5–5.2)
ALBUMIN/GLOB SERPL: 1.9 G/DL
ALP SERPL-CCNC: 68 U/L (ref 39–117)
ALT SERPL W P-5'-P-CCNC: 49 U/L (ref 1–41)
ANION GAP SERPL CALCULATED.3IONS-SCNC: 12.8 MMOL/L (ref 5–15)
AST SERPL-CCNC: 30 U/L (ref 1–40)
BASOPHILS # BLD AUTO: 0.04 10*3/MM3 (ref 0–0.2)
BASOPHILS NFR BLD AUTO: 0.3 % (ref 0–1.5)
BILIRUB SERPL-MCNC: 0.5 MG/DL (ref 0–1.2)
BUN SERPL-MCNC: 19 MG/DL (ref 6–20)
BUN/CREAT SERPL: 18.8 (ref 7–25)
CALCIUM SPEC-SCNC: 9.5 MG/DL (ref 8.6–10.5)
CHLORIDE SERPL-SCNC: 105 MMOL/L (ref 98–107)
CO2 SERPL-SCNC: 24.2 MMOL/L (ref 22–29)
CREAT SERPL-MCNC: 1.01 MG/DL (ref 0.76–1.27)
DEPRECATED RDW RBC AUTO: 40.8 FL (ref 37–54)
EGFRCR SERPLBLD CKD-EPI 2021: 86.2 ML/MIN/1.73
EOSINOPHIL # BLD AUTO: 0.22 10*3/MM3 (ref 0–0.4)
EOSINOPHIL NFR BLD AUTO: 1.8 % (ref 0.3–6.2)
ERYTHROCYTE [DISTWIDTH] IN BLOOD BY AUTOMATED COUNT: 12.4 % (ref 12.3–15.4)
GLOBULIN UR ELPH-MCNC: 2.5 GM/DL
GLUCOSE SERPL-MCNC: 130 MG/DL (ref 65–99)
HCT VFR BLD AUTO: 43 % (ref 37.5–51)
HGB BLD-MCNC: 14.9 G/DL (ref 13–17.7)
IMM GRANULOCYTES # BLD AUTO: 0.06 10*3/MM3 (ref 0–0.05)
IMM GRANULOCYTES NFR BLD AUTO: 0.5 % (ref 0–0.5)
LYMPHOCYTES # BLD AUTO: 2.06 10*3/MM3 (ref 0.7–3.1)
LYMPHOCYTES NFR BLD AUTO: 16.5 % (ref 19.6–45.3)
MCH RBC QN AUTO: 31.4 PG (ref 26.6–33)
MCHC RBC AUTO-ENTMCNC: 34.7 G/DL (ref 31.5–35.7)
MCV RBC AUTO: 90.7 FL (ref 79–97)
MONOCYTES # BLD AUTO: 1.23 10*3/MM3 (ref 0.1–0.9)
MONOCYTES NFR BLD AUTO: 9.8 % (ref 5–12)
NEUTROPHILS NFR BLD AUTO: 71.1 % (ref 42.7–76)
NEUTROPHILS NFR BLD AUTO: 8.91 10*3/MM3 (ref 1.7–7)
NRBC BLD AUTO-RTO: 0 /100 WBC (ref 0–0.2)
PLATELET # BLD AUTO: 197 10*3/MM3 (ref 140–450)
PMV BLD AUTO: 10 FL (ref 6–12)
POTASSIUM SERPL-SCNC: 4.5 MMOL/L (ref 3.5–5.2)
PROT SERPL-MCNC: 7.2 G/DL (ref 6–8.5)
RBC # BLD AUTO: 4.74 10*6/MM3 (ref 4.14–5.8)
SODIUM SERPL-SCNC: 142 MMOL/L (ref 136–145)
WBC NRBC COR # BLD AUTO: 12.52 10*3/MM3 (ref 3.4–10.8)

## 2025-06-28 PROCEDURE — 70450 CT HEAD/BRAIN W/O DYE: CPT

## 2025-06-28 PROCEDURE — 85025 COMPLETE CBC W/AUTO DIFF WBC: CPT | Performed by: STUDENT IN AN ORGANIZED HEALTH CARE EDUCATION/TRAINING PROGRAM

## 2025-06-28 PROCEDURE — 99284 EMERGENCY DEPT VISIT MOD MDM: CPT

## 2025-06-28 PROCEDURE — 80053 COMPREHEN METABOLIC PANEL: CPT | Performed by: STUDENT IN AN ORGANIZED HEALTH CARE EDUCATION/TRAINING PROGRAM

## 2025-06-28 PROCEDURE — 72125 CT NECK SPINE W/O DYE: CPT

## 2025-06-28 RX ORDER — HYDROCODONE BITARTRATE AND ACETAMINOPHEN 7.5; 325 MG/1; MG/1
1 TABLET ORAL ONCE
Refills: 0 | Status: COMPLETED | OUTPATIENT
Start: 2025-06-28 | End: 2025-06-28

## 2025-06-28 RX ORDER — METHOCARBAMOL 750 MG/1
750 TABLET, FILM COATED ORAL 3 TIMES DAILY PRN
Qty: 15 TABLET | Refills: 0 | Status: SHIPPED | OUTPATIENT
Start: 2025-06-28

## 2025-06-28 RX ORDER — DIAZEPAM 5 MG/1
5 TABLET ORAL EVERY 6 HOURS PRN
Qty: 5 TABLET | Refills: 0 | Status: SHIPPED | OUTPATIENT
Start: 2025-06-28

## 2025-06-28 RX ORDER — METHOCARBAMOL 750 MG/1
750 TABLET, FILM COATED ORAL 3 TIMES DAILY PRN
Qty: 15 TABLET | Refills: 0 | Status: SHIPPED | OUTPATIENT
Start: 2025-06-28 | End: 2025-06-28

## 2025-06-28 RX ORDER — LIDOCAINE 50 MG/G
1 PATCH TOPICAL EVERY 24 HOURS
Qty: 20 EACH | Refills: 0 | Status: SHIPPED | OUTPATIENT
Start: 2025-06-28

## 2025-06-28 RX ORDER — LIDOCAINE 4 G/G
1 PATCH TOPICAL
Status: DISCONTINUED | OUTPATIENT
Start: 2025-06-28 | End: 2025-06-29 | Stop reason: HOSPADM

## 2025-06-28 RX ORDER — METHOCARBAMOL 750 MG/1
750 TABLET, FILM COATED ORAL ONCE
Status: COMPLETED | OUTPATIENT
Start: 2025-06-28 | End: 2025-06-28

## 2025-06-28 RX ORDER — DIAZEPAM 5 MG/1
5 TABLET ORAL EVERY 6 HOURS PRN
Qty: 5 TABLET | Refills: 0 | Status: SHIPPED | OUTPATIENT
Start: 2025-06-28 | End: 2025-06-28

## 2025-06-28 RX ORDER — LIDOCAINE 50 MG/G
1 PATCH TOPICAL EVERY 24 HOURS
Qty: 20 EACH | Refills: 0 | Status: SHIPPED | OUTPATIENT
Start: 2025-06-28 | End: 2025-06-28

## 2025-06-28 RX ADMIN — HYDROCODONE BITARTRATE AND ACETAMINOPHEN 1 TABLET: 7.5; 325 TABLET ORAL at 19:40

## 2025-06-28 RX ADMIN — METHOCARBAMOL TABLETS 750 MG: 750 TABLET, COATED ORAL at 19:39

## 2025-06-28 RX ADMIN — LIDOCAINE 1 PATCH: 4 PATCH TOPICAL at 19:40

## 2025-06-28 NOTE — ED PROVIDER NOTES
EMERGENCY DEPARTMENT ENCOUNTER  Room Number:  32/32  PCP: Cassandra Trotter MD  Independent Historians: Patient and Family      HPI:  Chief Complaint: had concerns including Neck Pain.       Context: Reddy Stewart is a 59 y.o. male with a medical history of bipolar, HTN, COPD, diabetes who presents to the ED c/o acute right-sided neck pain.  Patient states symptoms began 3 days ago and were mild at first but have gradually worsened in intensity.  Patient states tonight the pain is so intense he cannot sleep or do anything.  Upon arrival patient began screaming and grabbing his head further questioning states the pain is all located to his right trapezius distribution.  No known injury.      Review of prior external notes (non-ED) -and- Review of prior external test results outside of this encounter: Office visit with internal medicine from earlier today reviewed and notable for presentation secondary to neck pain.  Patient was noted to have tenderness in the right trapezius which was painful even to light touch patient sent to the ER.    Prescription drug monitoring program review: [facility] reviewed by Bc Jimenez MD       PAST MEDICAL HISTORY  Active Ambulatory Problems     Diagnosis Date Noted    Attention deficit hyperactivity disorder (ADHD) 08/26/2020    Bipolar disorder 08/26/2020    Chronic obstructive pulmonary disease 08/26/2020    Acquired hypothyroidism 08/26/2020    Essential (primary) hypertension 08/26/2020    Cough syncope 09/10/2020    Anxiety attack 09/23/2020    Bipolar 1 disorder, mixed 09/23/2020    Body mass index (bmi) 31.0-31.9, adult 02/18/2019    COPD with chronic bronchitis 09/23/2020    Depression with anxiety 09/23/2020    Seasonal allergies 09/23/2020    Snoring 09/23/2020    Spells of decreased attentiveness 12/09/2020    Chronic cough 10/11/2021    Encounter for screening for malignant neoplasm of colon 10/11/2021    Type 2 diabetes mellitus with hyperglycemia, without long-term  current use of insulin 12/28/2022     Resolved Ambulatory Problems     Diagnosis Date Noted    No Resolved Ambulatory Problems     Past Medical History:   Diagnosis Date    Anxiety     Asthma     Cluster headache     COPD (chronic obstructive pulmonary disease)     Diabetes mellitus     Elevated cholesterol     Headache, tension-type     Hernia     Hypertension     Hypothyroidism     Injury of back     Migraine     Obesity     Seizures     Sleep apnea     Syncope          PAST SURGICAL HISTORY  Past Surgical History:   Procedure Laterality Date    COLONOSCOPY W/ POLYPECTOMY N/A 11/3/2021    Procedure: COLONOSCOPY;  Surgeon: Anthony Dotson MD;  Location:  [facility];  Service: Gastroenterology;  Laterality: N/A;  pre-screen  post-normal    ENDOSCOPY N/A 11/3/2021    Procedure: ESOPHAGOGASTRODUODENOSCOPY with biopsies;  Surgeon: Anthony Dotson MD;  Location:  [facility];  Service: Gastroenterology;  Laterality: N/A;  pre-chronic cough  post-gastritis, esophagitis    HAND SURGERY      HERNIA REPAIR      At 10 years old    KNEE SURGERY      SHOULDER SURGERY           FAMILY HISTORY  Family History   Problem Relation Age of Onset    Cancer Mother         Passed away    Heart disease Father         Passed away    Diabetes Sister     Malig Hyperthermia Neg Hx          SOCIAL HISTORY  Social History     Socioeconomic History    Marital status:    Tobacco Use    Smoking status: Never    Smokeless tobacco: Never   Vaping Use    Vaping status: Never Used   Substance and Sexual Activity    Alcohol use: Yes     Alcohol/week: 2.0 standard drinks of alcohol     Types: 1 Cans of beer, 1 Drinks containing 0.5 oz of alcohol per week     Comment: rarely    Drug use: Never    Sexual activity: Yes     Partners: Female     Birth control/protection: None       Chronic or social conditions impacting patient care (Social Determinants of Health):  Social Drivers of Health     Tobacco Use: High Risk  (6/28/2025)    Received from [facility]    Patient History     Smoking Tobacco Use: Never     Smokeless Tobacco Use: Current     Passive Exposure: Not on file   Alcohol Use: Not At Risk (11/13/2021)    Received from [facility]    AUDIT-C     Frequency of Alcohol Consumption: Never     Average Number of Drinks: Not on file     Frequency of Binge Drinking: Not on file   Financial Resource Strain: Low Risk  (8/31/2023)    Overall Financial Resource Strain (CARDIA)     Difficulty of Paying Living Expenses: Not very hard   Recent Concern: Financial Resource Strain - Medium Risk (8/28/2023)    Overall Financial Resource Strain (CARDIA)     Difficulty of Paying Living Expenses: Somewhat hard   Food Insecurity: No Food Insecurity (8/31/2023)    Hunger Vital Sign     Worried About Running Out of Food in the Last Year: Never true     Ran Out of Food in the Last Year: Never true   Recent Concern: Food Insecurity - Food Insecurity Present (8/28/2023)    Hunger Vital Sign     Worried About Running Out of Food in the Last Year: Sometimes true     Ran Out of Food in the Last Year: Sometimes true   Transportation Needs: No Transportation Needs (8/31/2023)    PRAPARE - Transportation     Lack of Transportation (Medical): No     Lack of Transportation (Non-Medical): No   Physical Activity: Insufficiently Active (8/28/2023)    Exercise Vital Sign     Days of Exercise per Week: 2 days     Minutes of Exercise per Session: 10 min   Stress: Stress Concern Present (8/28/2023)    [place] [employer] - Occupational Stress Questionnaire     Feeling of Stress : Rather much   Social Connections: Moderately Isolated (8/28/2023)    Social Connection and Isolation Panel [NHANES]     Frequency of Communication with Friends and Family: More than three times a week     Frequency of Social Gatherings with Friends and Family: Once a week     Attends Spiritism Services: Never     Active Member of Clubs or Organizations:  No     Attends Club or Organization Meetings: Never     Marital Status: Living with partner   Interpersonal Safety: Not At Risk (6/28/2025)    Abuse Screen     Unsafe at Home or Work/School: no     Feels Threatened by Someone?: no     Does Anyone Keep You from Contacting Others or Doint Things Outside the Home?: no     Physical Sign of Abuse Present: no   Depression: At risk (6/17/2025)    Received from [facility]    Depression     PHQ-9 Total Score: 24   Housing Stability: Unknown (8/31/2023)    Housing Stability     Current Living Arrangements: apartment     Potentially Unsafe Housing Conditions: Not on file   Utilities: Not on file   Health Literacy: Unknown (8/31/2023)    Education     Help with school or training?: Not on file     Preferred Language: English   Employment: Not on file   Disabilities: Not on file       ALLERGIES  [place]      REVIEW OF SYSTEMS  Review of Systems  Included in HPI  All systems reviewed and negative except for those discussed in HPI.      PHYSICAL EXAM    I have reviewed the triage vital signs and nursing notes.    ED Triage Vitals   Temp Heart Rate Resp BP SpO2   06/28/25 1844 06/28/25 1843 06/28/25 1843 06/28/25 1843 06/28/25 1843   98.7 °F (37.1 °C) 92 18 (!) 161/106 95 %      Temp src Heart Rate Source Patient Position BP Location FiO2 (%)   06/28/25 1844 06/28/25 1843 -- -- --   Oral Monitor          Physical Exam  GENERAL: alert, no acute distress  SKIN: Warm, dry  HENT: Normocephalic, atraumatic  EYES: no scleral icterus  CV: regular rhythm, regular rate  RESPIRATORY: normal effort, lungs clear  ABDOMEN: soft, nontender, nondistended  MUSCULOSKELETAL: no deformity, tenderness to palpation throughout the right trapezius  NEURO: alert, moves all extremities, follows commands            LAB RESULTS  Recent Results (from the past 24 hours)   Comprehensive Metabolic Panel    Collection Time: 06/28/25  7:30 PM    Specimen: Blood   Result Value Ref Range    Glucose 130 (H)  65 - 99 mg/dL    BUN 19.0 6.0 - 20.0 mg/dL    Creatinine 1.01 0.76 - 1.27 mg/dL    Sodium 142 136 - 145 mmol/L    Potassium 4.5 3.5 - 5.2 mmol/L    Chloride 105 98 - 107 mmol/L    CO2 24.2 22.0 - 29.0 mmol/L    Calcium 9.5 8.6 - 10.5 mg/dL    Total Protein 7.2 6.0 - 8.5 g/dL    Albumin 4.7 3.5 - 5.2 g/dL    ALT (SGPT) 49 (H) 1 - 41 U/L    AST (SGOT) 30 1 - 40 U/L    Alkaline Phosphatase 68 39 - 117 U/L    Total Bilirubin 0.5 0.0 - 1.2 mg/dL    Globulin 2.5 gm/dL    A/G Ratio 1.9 g/dL    BUN/Creatinine Ratio 18.8 7.0 - 25.0    Anion Gap 12.8 5.0 - 15.0 mmol/L    eGFR 86.2 >60.0 mL/min/1.73   CBC Auto Differential    Collection Time: 06/28/25  7:30 PM    Specimen: Blood   Result Value Ref Range    WBC 12.52 (H) 3.40 - 10.80 10*3/mm3    RBC 4.74 4.14 - 5.80 10*6/mm3    Hemoglobin 14.9 13.0 - 17.7 g/dL    Hematocrit 43.0 37.5 - 51.0 %    MCV 90.7 79.0 - 97.0 fL    MCH 31.4 26.6 - 33.0 pg    MCHC 34.7 31.5 - 35.7 g/dL    RDW 12.4 12.3 - 15.4 %    RDW-SD 40.8 37.0 - 54.0 fl    MPV 10.0 6.0 - 12.0 fL    Platelets 197 140 - 450 10*3/mm3    Neutrophil % 71.1 42.7 - 76.0 %    Lymphocyte % 16.5 (L) 19.6 - 45.3 %    Monocyte % 9.8 5.0 - 12.0 %    Eosinophil % 1.8 0.3 - 6.2 %    Basophil % 0.3 0.0 - 1.5 %    Immature Grans % 0.5 0.0 - 0.5 %    Neutrophils, Absolute 8.91 (H) 1.70 - 7.00 10*3/mm3    Lymphocytes, Absolute 2.06 0.70 - 3.10 10*3/mm3    Monocytes, Absolute 1.23 (H) 0.10 - 0.90 10*3/mm3    Eosinophils, Absolute 0.22 0.00 - 0.40 10*3/mm3    Basophils, Absolute 0.04 0.00 - 0.20 10*3/mm3    Immature Grans, Absolute 0.06 (H) 0.00 - 0.05 10*3/mm3    nRBC 0.0 0.0 - 0.2 /100 WBC         RADIOLOGY  CT Head Without Contrast  CT Head Without Contrast, CT Cervical Spine Without Contrast  Result Date: 6/28/2025  NON-CONTRAST CT HEAD & CT CERVICAL SPINE  REASON: Headache and neck pain, no known injury  COMPARISON STUDIES: Previous head and cervical spine CT, 9/14/2023.  TECHNIQUE:  Axial images were acquired from the skull base to  vertex without contrast, including multiplanar reformats, per standard departmental protocol.   Routine cervical spine CT without contrast. Multiplanar reformats were created. Radiation dose reduction techniques were utilized, including automated exposure control, and exposure modulation based on body size.  FINDINGS:  Head CT: There is no CT evidence of acute intracranial hemorrhage, mass, or infarct. There is volume loss, but there is no evidence of hydrocephalus or extra-axial fluid collection.  Brain parenchymal density is within normal limits.  Skull base and calvarium show no acute abnormality, and the extracranial soft tissues show no acute abnormality.  C-spine CT: There is no evidence of acute alignment abnormality.  There are spinal degenerative changes, but there is no fracture or other acute bony abnormality.  The paraspinous soft tissues show no acute abnormality.       Negative unenhanced head CT, no acute abnormality.  Negative cervical spine CT, degenerative change without acute abnormality.    This report was finalized on 6/28/2025 8:31 PM by Dr. Gibran Fishman M.D on Workstation: [id number]          MEDICATIONS GIVEN IN ER  Medications   Lidocaine 4 % 1 patch (1 patch Transdermal Medication Applied 6/28/25 1940)   HYDROcodone-acetaminophen (NORCO) 7.5-325 MG per tablet 1 tablet (1 tablet Oral Given 6/28/25 1940)   methocarbamol (ROBAXIN) tablet 750 mg (750 mg Oral Given 6/28/25 1939)         ORDERS PLACED DURING THIS VISIT:  Orders Placed This Encounter   Procedures    CT Head Without Contrast    CT Cervical Spine Without Contrast    Comprehensive Metabolic Panel    CBC Auto Differential    CBC & Differential         OUTPATIENT MEDICATION MANAGEMENT:  Current Facility-Administered Medications Ordered in Epic   Medication Dose Route Frequency Provider Last Rate Last Admin    Lidocaine 4 % 1 patch  1 patch Transdermal Q24H Bc Jimenez MD   1 patch at 06/28/25 1940     Current Outpatient  Medications Ordered in Epic   Medication Sig Dispense Refill    artificial tears (REFRESH LACRI-LUBE) ointment ophthalmic ointment Administer  into the left eye Every Night. 3.5 g 0    atorvastatin (LIPITOR) 80 MG tablet TAKE 1 TABLET BY MOUTH DAILY 90 tablet 0    baclofen (LIORESAL) 10 MG tablet TAKE ONE TABLET BY MOUTH THREE TIMES A DAY 30 tablet 1    Blood Glucose Monitoring Suppl (Accu-Chek Guide) w/Device kit USE TO TEST BLOOD SUGAR 1 kit 0    budesonide-formoterol (SYMBICORT) 160-4.5 MCG/ACT inhaler Inhale 2 puffs Daily. States he is only using as needed. 30 g 1    Continuous Blood Gluc  (FreeStyle Mukul 2 [place]) device 1 Device Continuous. 1 each 0    Continuous Blood Gluc Sensor (FreeStyle Mukul 2 Sensor) misc 1 each Every 14 (Fourteen) Days. 6 each 1    diazePAM (VALIUM) 5 MG tablet Take 1 tablet by mouth Every 6 (Six) Hours As Needed for Anxiety. 5 tablet 0    diclofenac (VOLTAREN) 75 MG EC tablet TAKE ONE TABLET BY MOUTH TWICE A  tablet 1    divalproex (DEPAKOTE ER) 500 MG 24 hr tablet Take 1 tablet by mouth every night at bedtime.      glucose blood test strip 1 each by Other route Daily. 100 each 3    HYDROcodone-acetaminophen (NORCO) 7.5-325 MG per tablet Take 1 tablet by mouth Every 6 (Six) Hours As Needed for Moderate Pain. (Patient not taking: Reported on 9/22/2023)      hydrOXYzine (ATARAX) 25 MG tablet Take 1 tablet by mouth Daily. 2 times a day      Januvia 100 MG tablet TAKE ONE TABLET BY MOUTH DAILY 30 tablet 5    Lancets misc 1 each Daily. 100 each 3    levothyroxine (SYNTHROID, LEVOTHROID) 50 MCG tablet TAKE 1 TABLET BY MOUTH DAILY 90 tablet 0    lidocaine (LIDODERM) 5 % Place 1 patch on the skin as directed by provider Daily. Remove & Discard patch within 12 hours or as directed by MD 20 each 0    lithium carbonate 300 MG capsule Take 1 capsule by mouth Daily.      metFORMIN (GLUCOPHAGE) 500 MG tablet TAKE TWO TABLETS BY MOUTH TWICE A DAY WITH MEALS 360 tablet 1     methocarbamol (ROBAXIN) 750 MG tablet Take 1 tablet by mouth 3 (Three) Times a Day As Needed for Muscle Spasms. 15 tablet 0    naloxone (NARCAN) 4 MG/0.1ML nasal spray Call 911. Don't prime. [place] in 1 nostril for overdose. Repeat in 2-3 minutes in other nostril if no or minimal breathing/responsiveness. 2 each 0    oxyCODONE (ROXICODONE) 5 MG immediate release tablet       QUEtiapine (SEROquel) 300 MG tablet Take 1 tablet by mouth every night at bedtime.      traZODone (DESYREL) 150 MG tablet Take 1 tablet by mouth Every Night.      valsartan (DIOVAN) 160 MG tablet TAKE 1 TABLET BY MOUTH DAILY 90 tablet 1         PROCEDURES  Procedures            PROGRESS, DATA ANALYSIS, CONSULTS, AND MEDICAL DECISION MAKING  All labs have been independently interpreted by me.  All radiology studies have been reviewed by me. All EKG's have been independently viewed and interpreted by me.  Discussion below represents my analysis of pertinent findings related to patient's condition, differential diagnosis, treatment plan and final disposition.    Differential diagnosis includes but is not limited to muscle spasm, torticollis, migraine, ICH.    Clinical Scores:                                       ED Course as of 06/28/25 2138   Sat Jun 28, 2025 2013 CT head interpreted by me demonstrates no evidence of intracranial hemorrhage [MW]   2137 Workup in the emergency department is overall unremarkable.  Radiological evaluation of the head and C-spine demonstrate no evidence of acute abnormality.  Patient treated with Lidoderm patch, Norco and muscle relaxer.  On reassessment patient demonstrates improvement in his symptoms.  Explained findings and answered all the patient's questions.  Will discharge with a prescription for muscle laxer, Lidoderm patches and a couple Valium.  Patient instructed on safe use.  Return precautions given and patient comes to follow closely with primary care. [MW]      ED Course User Index  [MW] Tony  Bc KEMP MD             AS OF 21:38 EDT VITALS:    BP - 166/92  HR - 83  TEMP - 98.7 °F (37.1 °C) (Oral)  O2 SATS - 96%    COMPLEXITY OF CARE  Admission was considered but after careful review of the patient's presentation, physical examination, diagnostic results, and response to treatment the patient may be safely discharged with outpatient follow-up.      DIAGNOSIS  Final diagnoses:   Muscle spasm         DISPOSITION  ED Disposition       ED Disposition   Discharge    Condition   Stable    Comment   --                Please note that portions of this document were completed with a voice recognition program.    Note Disclaimer: At [facility], we believe that sharing information builds trust and better relationships. You are receiving this note because you recently visited [facility]. It is possible you will see health information before a provider has talked with you about it. This kind of information can be easy to misunderstand. To help you fully understand what it means for your health, we urge you to discuss this note with your provider.         Bc Jimenez MD  06/28/25 2728

## 2025-06-29 NOTE — DISCHARGE INSTRUCTIONS
Follow-up with your primary care physician within 3 to 4 days for repeat evaluation    Please return to the ER with new or worsening symptoms include but not limited to uncontrolled pain, changes in mental status, inability to ambulate, chest pain, shortness of breath    Please do not drive or operate heavy machinery while using Valium or muscle relaxers    Please do not use Robaxin, Valium or any narcotic pain medication at the same time

## (undated) DEVICE — KT ORCA ORCAPOD DISP STRL

## (undated) DEVICE — TUBING, SUCTION, 1/4" X 10', STRAIGHT: Brand: MEDLINE

## (undated) DEVICE — SENSR O2 OXIMAX FNGR A/ 18IN NONSTR

## (undated) DEVICE — BITEBLOCK OMNI BLOC

## (undated) DEVICE — ADAPT CLN BIOGUARD AIR/H2O DISP

## (undated) DEVICE — CANN O2 ETCO2 FITS ALL CONN CO2 SMPL A/ 7IN DISP LF

## (undated) DEVICE — LN SMPL CO2 SHTRM SD STREAM W/M LUER